# Patient Record
Sex: MALE | Race: WHITE | Employment: FULL TIME | URBAN - METROPOLITAN AREA
[De-identification: names, ages, dates, MRNs, and addresses within clinical notes are randomized per-mention and may not be internally consistent; named-entity substitution may affect disease eponyms.]

---

## 2020-02-24 ENCOUNTER — OP HISTORICAL/CONVERTED ENCOUNTER (OUTPATIENT)
Dept: OTHER | Age: 65
End: 2020-02-24

## 2021-11-08 ENCOUNTER — TRANSCRIBE ORDER (OUTPATIENT)
Dept: SCHEDULING | Age: 66
End: 2021-11-08

## 2021-11-08 DIAGNOSIS — R29.898 RIGHT ARM WEAKNESS: Primary | ICD-10-CM

## 2022-07-30 ENCOUNTER — TRANSCRIBE ORDER (OUTPATIENT)
Dept: SCHEDULING | Age: 67
End: 2022-07-30

## 2022-08-02 ENCOUNTER — TRANSCRIBE ORDER (OUTPATIENT)
Dept: SCHEDULING | Age: 67
End: 2022-08-02

## 2022-08-02 DIAGNOSIS — G25.5 MUSCLE, JERKY MOVEMENTS (UNCONTROLLED): Primary | ICD-10-CM

## 2022-08-17 ENCOUNTER — HOSPITAL ENCOUNTER (OUTPATIENT)
Dept: MRI IMAGING | Age: 67
Discharge: HOME | End: 2022-08-17
Attending: FAMILY MEDICINE
Payer: COMMERCIAL

## 2022-08-17 DIAGNOSIS — G25.5 MUSCLE, JERKY MOVEMENTS (UNCONTROLLED): ICD-10-CM

## 2022-08-17 LAB — CREAT BLD-MCNC: 0.7 MG/DL (ref 0.6–1.3)

## 2022-08-17 PROCEDURE — A9577 INJ MULTIHANCE: HCPCS | Performed by: FAMILY MEDICINE

## 2022-08-17 PROCEDURE — 70553 MRI BRAIN STEM W/O & W/DYE: CPT

## 2022-08-17 PROCEDURE — 82565 ASSAY OF CREATININE: CPT

## 2022-08-17 PROCEDURE — 74011250636 HC RX REV CODE- 250/636: Performed by: FAMILY MEDICINE

## 2022-08-17 RX ADMIN — GADOBENATE DIMEGLUMINE 20 ML: 529 INJECTION, SOLUTION INTRAVENOUS at 14:44

## 2022-08-22 ENCOUNTER — TRANSCRIBE ORDER (OUTPATIENT)
Dept: SCHEDULING | Age: 67
End: 2022-08-22

## 2022-08-22 DIAGNOSIS — I65.29 INTERNAL CAROTID ARTERY STENOSIS: Primary | ICD-10-CM

## 2022-09-07 ENCOUNTER — TRANSCRIBE ORDER (OUTPATIENT)
Dept: SCHEDULING | Age: 67
End: 2022-09-07

## 2022-09-07 DIAGNOSIS — I65.22 OCCLUSION OF LEFT CAROTID ARTERY: Primary | ICD-10-CM

## 2022-09-13 ENCOUNTER — HOSPITAL ENCOUNTER (OUTPATIENT)
Dept: CT IMAGING | Age: 67
Discharge: HOME | End: 2022-09-13
Payer: COMMERCIAL

## 2022-09-13 DIAGNOSIS — I65.22 OCCLUSION OF LEFT CAROTID ARTERY: ICD-10-CM

## 2022-09-13 PROCEDURE — 70498 CT ANGIOGRAPHY NECK: CPT

## 2022-09-13 PROCEDURE — 74011000636 HC RX REV CODE- 636: Performed by: SURGERY

## 2022-09-13 RX ADMIN — IOPAMIDOL 100 ML: 755 INJECTION, SOLUTION INTRAVENOUS at 13:32

## 2024-05-04 ENCOUNTER — APPOINTMENT (OUTPATIENT)
Facility: HOSPITAL | Age: 69
End: 2024-05-04
Payer: MEDICARE

## 2024-05-04 ENCOUNTER — HOSPITAL ENCOUNTER (EMERGENCY)
Facility: HOSPITAL | Age: 69
Discharge: HOME | End: 2024-05-04
Attending: EMERGENCY MEDICINE
Payer: MEDICARE

## 2024-05-04 VITALS
RESPIRATION RATE: 16 BRPM | HEIGHT: 69 IN | SYSTOLIC BLOOD PRESSURE: 178 MMHG | BODY MASS INDEX: 31.1 KG/M2 | OXYGEN SATURATION: 97 % | TEMPERATURE: 98.2 F | DIASTOLIC BLOOD PRESSURE: 86 MMHG | HEART RATE: 78 BPM | WEIGHT: 210 LBS

## 2024-05-04 DIAGNOSIS — I10 ESSENTIAL HYPERTENSION: ICD-10-CM

## 2024-05-04 DIAGNOSIS — S42.024A CLOSED NONDISPLACED FRACTURE OF SHAFT OF RIGHT CLAVICLE, INITIAL ENCOUNTER: Primary | ICD-10-CM

## 2024-05-04 PROCEDURE — 6370000000 HC RX 637 (ALT 250 FOR IP): Performed by: EMERGENCY MEDICINE

## 2024-05-04 PROCEDURE — 99283 EMERGENCY DEPT VISIT LOW MDM: CPT

## 2024-05-04 PROCEDURE — 71045 X-RAY EXAM CHEST 1 VIEW: CPT

## 2024-05-04 PROCEDURE — 73030 X-RAY EXAM OF SHOULDER: CPT

## 2024-05-04 PROCEDURE — 73080 X-RAY EXAM OF ELBOW: CPT

## 2024-05-04 RX ORDER — HYDROCODONE BITARTRATE AND ACETAMINOPHEN 5; 325 MG/1; MG/1
1 TABLET ORAL EVERY 6 HOURS PRN
Qty: 12 TABLET | Refills: 0 | Status: SHIPPED | OUTPATIENT
Start: 2024-05-04 | End: 2024-05-07

## 2024-05-04 RX ORDER — AMLODIPINE BESYLATE 5 MG/1
10 TABLET ORAL
Status: COMPLETED | OUTPATIENT
Start: 2024-05-04 | End: 2024-05-04

## 2024-05-04 RX ORDER — HYDROCODONE BITARTRATE AND ACETAMINOPHEN 5; 325 MG/1; MG/1
1 TABLET ORAL
Status: COMPLETED | OUTPATIENT
Start: 2024-05-04 | End: 2024-05-04

## 2024-05-04 RX ADMIN — AMLODIPINE BESYLATE 10 MG: 5 TABLET ORAL at 22:26

## 2024-05-04 RX ADMIN — HYDROCODONE BITARTRATE AND ACETAMINOPHEN 1 TABLET: 5; 325 TABLET ORAL at 20:55

## 2024-05-04 ASSESSMENT — PAIN DESCRIPTION - PAIN TYPE: TYPE: ACUTE PAIN

## 2024-05-04 ASSESSMENT — PAIN - FUNCTIONAL ASSESSMENT
PAIN_FUNCTIONAL_ASSESSMENT: 0-10

## 2024-05-04 ASSESSMENT — PAIN SCALES - GENERAL
PAINLEVEL_OUTOF10: 9
PAINLEVEL_OUTOF10: 6
PAINLEVEL_OUTOF10: 4
PAINLEVEL_OUTOF10: 4

## 2024-05-04 ASSESSMENT — PAIN DESCRIPTION - LOCATION
LOCATION: SHOULDER
LOCATION: SHOULDER

## 2024-05-04 ASSESSMENT — PAIN DESCRIPTION - ORIENTATION
ORIENTATION: RIGHT
ORIENTATION: RIGHT

## 2024-05-04 ASSESSMENT — LIFESTYLE VARIABLES
HOW MANY STANDARD DRINKS CONTAINING ALCOHOL DO YOU HAVE ON A TYPICAL DAY: PATIENT DOES NOT DRINK
HOW OFTEN DO YOU HAVE A DRINK CONTAINING ALCOHOL: NEVER

## 2024-05-05 NOTE — ED TRIAGE NOTES
Presents with EMS after ground level fall, falling to right shoulder pain, states was splinting right arm on arrival and c/o right clavicular pain. Patient presents splinted by EMS. Denies LOC, denies hitting head, denies blood thinner use. Hx of stroke.

## 2024-05-05 NOTE — DISCHARGE INSTRUCTIONS
Thank you!  Thank you for allowing me to care for you in the emergency department. It is my goal to provide you with excellent care.  Please fill out the survey that will come to you by mail or email since we listen to your feedback!     Below you will find a list of your tests from today's visit.  Should you have any questions, please do not hesitate to call the emergency department.    Labs  No results found for this or any previous visit (from the past 12 hour(s)).    Radiologic Studies  XR CHEST PORTABLE   Final Result      Comminuted midshaft right clavicle fracture. Lungs are clear.         XR ELBOW RIGHT (MIN 3 VIEWS)   Final Result   No acute abnormality.      XR SHOULDER RIGHT (MIN 2 VIEWS)   Final Result   Comminuted angulated midshaft clavicle fracture.        ------------------------------------------------------------------------------------------------------------  The exam and treatment you received in the Emergency Department were for an urgent problem and are not intended as complete care. It is important that you follow-up with a doctor, nurse practitioner, or physician assistant to:  (1) confirm your diagnosis,  (2) re-evaluation of changes in your illness and treatment, and (3) for ongoing care. Please take your discharge instructions with you when you go to your follow-up appointment.     If you have any problem arranging a follow-up appointment, contact the Emergency Department.  If your symptoms become worse or you do not improve as expected and you are unable to reach your health care provider, please return to the Emergency Department. We are available 24 hours a day.     If a prescription has been provided, please have it filled as soon as possible to prevent a delay in treatment. If you have any questions or reservations about taking the medication due to side effects or interactions with other medications, please call your primary care provider or contact the ER.

## 2024-05-05 NOTE — ED PROVIDER NOTES
LAB, EKG AND DIAGNOSTIC RESULTS   Labs:  No results found for this or any previous visit (from the past 12 hour(s)).    EKG:.Not Applicable    Radiologic Studies:  Non-plain film images such as CT, Ultrasound and MRI are read by the radiologist. Plain radiographic images are visualized and preliminarily interpreted by the ED Provider with the following findings: See ED Course Below    Interpretation per the Radiologist below, if available at the time of this note:  XR CHEST PORTABLE   Final Result      Comminuted midshaft right clavicle fracture. Lungs are clear.         XR ELBOW RIGHT (MIN 3 VIEWS)   Final Result   No acute abnormality.      XR SHOULDER RIGHT (MIN 2 VIEWS)   Final Result   Comminuted angulated midshaft clavicle fracture.           ED COURSE and DIFFERENTIAL DIAGNOSIS/MDM   9:21 PM Differential and Considerations: 69-year-old status post mechanical fall with shoulder injury.  Neurovascularly intact.  Will obtain x-rays consider shoulder fracture versus dislocation versus clavicle injury    Records Reviewed (source and summary of external notes): Prior medical records and Nursing notes.    Vitals:    Vitals:    05/04/24 2011   BP: (!) 216/107   Pulse: 84   Resp: 16   Temp: 98.6 °F (37 °C)   TempSrc: Oral   SpO2: 96%   Weight: 95.3 kg (210 lb)   Height: 1.753 m (5' 9\")        ED COURSE  ED Course as of 05/04/24 2121   Sat May 04, 2024   2106 XR ELBOW RIGHT (MIN 3 VIEWS)  FINDINGS: Three views of the right elbow demonstrate no fracture, dislocation,  effusion or other acute abnormality.     IMPRESSION:  No acute abnormality.    -> X-ray reviewed independently by ER physician no evidence of fracture or dislocation.   [HP]   2106 XR CHEST PORTABLE  IMPRESSION:     Comminuted midshaft right clavicle fracture. Lungs are clear.    Chest x-ray interpreted independently by ER physician positive for clavicle fracture [HP]   2107 XR SHOULDER RIGHT (MIN 2 VIEWS)  FINDINGS: Three views of the

## 2024-09-17 ENCOUNTER — HOSPITAL ENCOUNTER (OUTPATIENT)
Facility: HOSPITAL | Age: 69
Discharge: HOME | End: 2024-09-19
Attending: FAMILY MEDICINE
Payer: MEDICARE

## 2024-09-17 DIAGNOSIS — I63.9 CEREBRAL INFARCTION, UNSPECIFIED MECHANISM (HCC): ICD-10-CM

## 2024-09-17 PROCEDURE — 93880 EXTRACRANIAL BILAT STUDY: CPT

## 2024-09-20 LAB
VAS LEFT CCA DIST EDV: 13.6 CM/S
VAS LEFT CCA DIST PSV: 69.3 CM/S
VAS LEFT CCA PROX EDV: 11.6 CM/S
VAS LEFT CCA PROX PSV: 73.4 CM/S
VAS LEFT DIST OUTFLOW EDV: 6.6 CM/S
VAS LEFT DIST OUTFLOW PSV: 82.5 CM/S
VAS LEFT ECA EDV: 17.1 CM/S
VAS LEFT ECA PSV: 107 CM/S
VAS LEFT GRAFT 1: NORMAL
VAS LEFT ICA DIST EDV: 8.2 CM/S
VAS LEFT ICA DIST PSV: 64.6 CM/S
VAS LEFT ICA PROX EDV: 12.3 CM/S
VAS LEFT ICA PROX PSV: 120 CM/S
VAS LEFT ICA/CCA PSV: 1.73
VAS LEFT INFLOW ARTERY EDV: 7.7 CM/S
VAS LEFT INFLOW ARTERY PSV: 67.7 CM/S
VAS LEFT MID OUTFLOW EDV: 12.3 CM/S
VAS LEFT MID OUTFLOW PSV: 120 CM/S
VAS LEFT OUTFLOW VESSEL EDV: 8.2 CM/S
VAS LEFT OUTFLOW VESSEL PSV: 64.6 CM/S
VAS LEFT PROX OUTFLOW EDV: 6.9 CM/S
VAS LEFT PROX OUTFLOW PSV: 84 CM/S
VAS LEFT SUBCLAVIAN PROX EDV: 0 CM/S
VAS LEFT SUBCLAVIAN PROX PSV: 78.1 CM/S
VAS LEFT VERTEBRAL EDV: 10.2 CM/S
VAS LEFT VERTEBRAL PSV: 48.9 CM/S
VAS RIGHT CCA DIST EDV: 12.3 CM/S
VAS RIGHT CCA DIST PSV: 80.5 CM/S
VAS RIGHT CCA PROX EDV: 10.7 CM/S
VAS RIGHT CCA PROX PSV: 91.2 CM/S
VAS RIGHT ECA EDV: 1.71 CM/S
VAS RIGHT ECA PSV: 247 CM/S
VAS RIGHT ICA DIST EDV: 15.6 CM/S
VAS RIGHT ICA DIST PSV: 70.2 CM/S
VAS RIGHT ICA PROX EDV: 32 CM/S
VAS RIGHT ICA PROX PSV: 159 CM/S
VAS RIGHT ICA/CCA PSV: 1.98
VAS RIGHT SUBCLAVIAN PROX EDV: 0 CM/S
VAS RIGHT SUBCLAVIAN PROX PSV: 80.5 CM/S
VAS RIGHT VERTEBRAL EDV: 6.45 CM/S
VAS RIGHT VERTEBRAL PSV: 39.8 CM/S

## 2024-10-19 PROBLEM — I63.9 CEREBRAL INFARCTION (HCC): Status: ACTIVE | Noted: 2024-10-19

## 2025-02-25 ENCOUNTER — APPOINTMENT (OUTPATIENT)
Facility: HOSPITAL | Age: 70
End: 2025-02-25
Payer: MEDICARE

## 2025-02-25 ENCOUNTER — HOSPITAL ENCOUNTER (EMERGENCY)
Facility: HOSPITAL | Age: 70
Discharge: HOME | End: 2025-02-25
Payer: MEDICARE

## 2025-02-25 VITALS
HEIGHT: 69 IN | RESPIRATION RATE: 18 BRPM | HEART RATE: 80 BPM | DIASTOLIC BLOOD PRESSURE: 70 MMHG | WEIGHT: 210 LBS | SYSTOLIC BLOOD PRESSURE: 148 MMHG | OXYGEN SATURATION: 100 % | BODY MASS INDEX: 31.1 KG/M2 | TEMPERATURE: 98 F

## 2025-02-25 DIAGNOSIS — K64.4 EXTERNAL HEMORRHOIDS: ICD-10-CM

## 2025-02-25 DIAGNOSIS — K62.5 RECTAL BLEEDING: Primary | ICD-10-CM

## 2025-02-25 LAB
ALBUMIN SERPL-MCNC: 3.3 G/DL (ref 3.5–5)
ALBUMIN/GLOB SERPL: 0.9 (ref 1.1–2.2)
ALP SERPL-CCNC: 122 U/L (ref 45–117)
ALT SERPL-CCNC: 22 U/L (ref 12–78)
ANION GAP SERPL CALC-SCNC: 7 MMOL/L (ref 2–12)
AST SERPL W P-5'-P-CCNC: 20 U/L (ref 15–37)
BASOPHILS # BLD: 0.03 K/UL (ref 0–0.1)
BASOPHILS NFR BLD: 0.4 % (ref 0–1)
BILIRUB SERPL-MCNC: 0.5 MG/DL (ref 0.2–1)
BUN SERPL-MCNC: 15 MG/DL (ref 6–20)
BUN/CREAT SERPL: 18 (ref 12–20)
CA-I BLD-MCNC: 9.2 MG/DL (ref 8.5–10.1)
CHLORIDE SERPL-SCNC: 111 MMOL/L (ref 97–108)
CO2 SERPL-SCNC: 25 MMOL/L (ref 21–32)
CREAT SERPL-MCNC: 0.82 MG/DL (ref 0.7–1.3)
DIFFERENTIAL METHOD BLD: ABNORMAL
EOSINOPHIL # BLD: 0.14 K/UL (ref 0–0.4)
EOSINOPHIL NFR BLD: 1.7 % (ref 0–7)
ERYTHROCYTE [DISTWIDTH] IN BLOOD BY AUTOMATED COUNT: 13 % (ref 11.5–14.5)
GLOBULIN SER CALC-MCNC: 3.6 G/DL (ref 2–4)
GLUCOSE SERPL-MCNC: 222 MG/DL (ref 65–100)
HCT VFR BLD AUTO: 41.4 % (ref 36.6–50.3)
HGB BLD-MCNC: 13.9 G/DL (ref 12.1–17)
IMM GRANULOCYTES # BLD AUTO: 0.02 K/UL (ref 0–0.04)
IMM GRANULOCYTES NFR BLD AUTO: 0.2 % (ref 0–0.5)
LYMPHOCYTES # BLD: 1.25 K/UL (ref 0.8–3.5)
LYMPHOCYTES NFR BLD: 15 % (ref 12–49)
MCH RBC QN AUTO: 30.1 PG (ref 26–34)
MCHC RBC AUTO-ENTMCNC: 33.6 G/DL (ref 30–36.5)
MCV RBC AUTO: 89.6 FL (ref 80–99)
MONOCYTES # BLD: 0.52 K/UL (ref 0–1)
MONOCYTES NFR BLD: 6.2 % (ref 5–13)
NEUTS SEG # BLD: 6.38 K/UL (ref 1.8–8)
NEUTS SEG NFR BLD: 76.5 % (ref 32–75)
NRBC # BLD: 0 K/UL (ref 0–0.01)
NRBC BLD-RTO: 0 PER 100 WBC
PLATELET # BLD AUTO: 218 K/UL (ref 150–400)
PMV BLD AUTO: 11.2 FL (ref 8.9–12.9)
POTASSIUM SERPL-SCNC: 3.6 MMOL/L (ref 3.5–5.1)
PROT SERPL-MCNC: 6.9 G/DL (ref 6.4–8.2)
RBC # BLD AUTO: 4.62 M/UL (ref 4.1–5.7)
SODIUM SERPL-SCNC: 143 MMOL/L (ref 136–145)
WBC # BLD AUTO: 8.3 K/UL (ref 4.1–11.1)

## 2025-02-25 PROCEDURE — 74174 CTA ABD&PLVS W/CONTRAST: CPT

## 2025-02-25 PROCEDURE — 99285 EMERGENCY DEPT VISIT HI MDM: CPT

## 2025-02-25 PROCEDURE — 80053 COMPREHEN METABOLIC PANEL: CPT

## 2025-02-25 PROCEDURE — 36415 COLL VENOUS BLD VENIPUNCTURE: CPT

## 2025-02-25 PROCEDURE — 6360000004 HC RX CONTRAST MEDICATION

## 2025-02-25 PROCEDURE — 85025 COMPLETE CBC W/AUTO DIFF WBC: CPT

## 2025-02-25 RX ORDER — IOPAMIDOL 755 MG/ML
100 INJECTION, SOLUTION INTRAVASCULAR
Status: COMPLETED | OUTPATIENT
Start: 2025-02-25 | End: 2025-02-25

## 2025-02-25 RX ORDER — POLYETHYLENE GLYCOL 3350 17 G/17G
17 POWDER, FOR SOLUTION ORAL DAILY PRN
Qty: 30 PACKET | Refills: 0 | Status: SHIPPED | OUTPATIENT
Start: 2025-02-25 | End: 2025-03-27

## 2025-02-25 RX ADMIN — IOPAMIDOL 100 ML: 755 INJECTION, SOLUTION INTRAVENOUS at 02:28

## 2025-02-25 ASSESSMENT — PAIN - FUNCTIONAL ASSESSMENT
PAIN_FUNCTIONAL_ASSESSMENT: NONE - DENIES PAIN
PAIN_FUNCTIONAL_ASSESSMENT: NONE - DENIES PAIN

## 2025-02-25 NOTE — ED TRIAGE NOTES
PER Pt: Pt reports a moderate amount of rectal bleeding, bright red blood that started tonight. Pt feels like he is constipated. +BT. Denies SOB, chest pain, N&V.    PMH: Stroke; right sided deficits and mild aphasia at baseline.

## 2025-02-25 NOTE — DISCHARGE INSTRUCTIONS
Thank you for choosing our Emergency Department for your care.  It is our privilege to care for you in your time of need.  In the next several days, you may receive a survey via email or mailed to your home about your experience with our team.  We would greatly appreciate you taking a few minutes to complete the survey, as we use this information to learn what we have done well and what we could be doing better. Thank you for trusting us with your care!    Below you will find a list of your tests from today's visit.   Labs and Radiology Studies  Recent Results (from the past 12 hour(s))   CBC with Auto Differential    Collection Time: 02/25/25  1:28 AM   Result Value Ref Range    WBC 8.3 4.1 - 11.1 K/uL    RBC 4.62 4.10 - 5.70 M/uL    Hemoglobin 13.9 12.1 - 17.0 g/dL    Hematocrit 41.4 36.6 - 50.3 %    MCV 89.6 80.0 - 99.0 FL    MCH 30.1 26.0 - 34.0 PG    MCHC 33.6 30.0 - 36.5 g/dL    RDW 13.0 11.5 - 14.5 %    Platelets 218 150 - 400 K/uL    MPV 11.2 8.9 - 12.9 FL    Nucleated RBCs 0.0 0.0  WBC    nRBC 0.00 0.00 - 0.01 K/uL    Neutrophils % 76.5 (H) 32.0 - 75.0 %    Lymphocytes % 15.0 12.0 - 49.0 %    Monocytes % 6.2 5.0 - 13.0 %    Eosinophils % 1.7 0.0 - 7.0 %    Basophils % 0.4 0.0 - 1.0 %    Immature Granulocytes % 0.2 0 - 0.5 %    Neutrophils Absolute 6.38 1.80 - 8.00 K/UL    Lymphocytes Absolute 1.25 0.80 - 3.50 K/UL    Monocytes Absolute 0.52 0.00 - 1.00 K/UL    Eosinophils Absolute 0.14 0.00 - 0.40 K/UL    Basophils Absolute 0.03 0.00 - 0.10 K/UL    Immature Granulocytes Absolute 0.02 0.00 - 0.04 K/UL    Differential Type AUTOMATED     Comprehensive Metabolic Panel    Collection Time: 02/25/25  1:28 AM   Result Value Ref Range    Sodium 143 136 - 145 mmol/L    Potassium 3.6 3.5 - 5.1 mmol/L    Chloride 111 (H) 97 - 108 mmol/L    CO2 25 21 - 32 mmol/L    Anion Gap 7 2 - 12 mmol/L    Glucose 222 (H) 65 - 100 mg/dL    BUN 15 6 - 20 mg/dL    Creatinine 0.82 0.70 - 1.30 mg/dL    BUN/Creatinine Ratio 18

## 2025-02-25 NOTE — ED PROVIDER NOTES
[facility] EMERGENCY DEPT  EMERGENCY DEPARTMENT HISTORY AND PHYSICAL EXAM      Date: 2/25/2025  Patient Name: Stewart Ramos  MRN: [id number]  Birthdate 1955  Date of evaluation: 2/25/2025  Provider: Rachelle Herrera MD   Note Started: 7:32 AM EST 2/25/25    HISTORY OF PRESENT ILLNESS     Chief Complaint   Patient presents with    Rectal Bleeding       History Provided By: Patient    HPI: Stewart Ramos is a 69 y.o. male who presents with bright red bleeding per rectum.  Patient reports that he has had some mild constipation, reports that today he was straining to poop and was unable to have a bowel movement.  He tried 1 to the bathroom several times, then the last time he noted a few drops of blood in the toilet, also felt a \"knot\" near his anus.  Has had mild abdominal cramping, denies any nausea or vomiting.  He is on Plavix.    PAST MEDICAL HISTORY   Past Medical History:  Past Medical History:   Diagnosis Date    CVA (cerebral vascular accident) (HCC)     Diabetes mellitus (HCC)     Hypertension        Past Surgical History:  History reviewed. No pertinent surgical history.    Family History:  History reviewed. No pertinent family history.    Social History:  Social History     Tobacco Use    Smoking status: Former     Types: Cigarettes   Substance Use Topics    Alcohol use: Never    Drug use: Never       Allergies:  No Known Allergies    PCP: Roel Benítez MD    Current Meds:   No current facility-administered medications for this encounter.     Current Outpatient Medications   Medication Sig Dispense Refill    polyethylene glycol (MIRALAX) 17 g packet Take 1 packet by mouth daily as needed for Constipation 30 packet 0       Social Determinants of Health:   Social Determinants of Health     Tobacco Use: Medium Risk (10/14/2024)    Received from [facility]    Patient History     Smoking Tobacco Use: Former     Smokeless Tobacco Use: Never     Passive Exposure: Not on file   Alcohol Use: Not At Risk

## 2025-03-17 ENCOUNTER — TRANSCRIBE ORDERS (OUTPATIENT)
Facility: HOSPITAL | Age: 70
End: 2025-03-17

## 2025-03-17 ENCOUNTER — TELEPHONE (OUTPATIENT)
Age: 70
End: 2025-03-17

## 2025-03-17 DIAGNOSIS — I70.209 ATHEROSCLEROSIS OF NATIVE ARTERY OF EXTREMITY, UNSPECIFIED EXTREMITY, WITH UNSPECIFIED PRESENCE OF CLINICAL MANIFESTATION: Primary | ICD-10-CM

## 2025-03-17 NOTE — TELEPHONE ENCOUNTER
Received a referral tried to call patient left a message for patient to call the office and schedule an appointment.

## 2025-04-01 ENCOUNTER — HOSPITAL ENCOUNTER (OUTPATIENT)
Facility: HOSPITAL | Age: 70
Discharge: HOME | End: 2025-04-03
Attending: FAMILY MEDICINE
Payer: MEDICARE

## 2025-04-01 DIAGNOSIS — I70.209 ATHEROSCLEROSIS OF NATIVE ARTERY OF EXTREMITY, UNSPECIFIED EXTREMITY, WITH UNSPECIFIED PRESENCE OF CLINICAL MANIFESTATION: ICD-10-CM

## 2025-04-01 PROCEDURE — 93922 UPR/L XTREMITY ART 2 LEVELS: CPT

## 2025-04-02 LAB
VAS LEFT ABI: 0.35
VAS LEFT ARM BP: 155 MMHG
VAS LEFT PTA BP: 54 MMHG
VAS LEFT TBI: 0.63
VAS LEFT TOE PRESSURE: 98 MMHG
VAS RIGHT ABI: 0.81
VAS RIGHT ARM BP: 153 MMHG
VAS RIGHT PTA BP: 126 MMHG
VAS RIGHT TBI: 0.68
VAS RIGHT TOE PRESSURE: 106 MMHG

## 2025-04-02 PROCEDURE — 93922 UPR/L XTREMITY ART 2 LEVELS: CPT | Performed by: SURGERY

## 2025-04-17 ENCOUNTER — OFFICE VISIT (OUTPATIENT)
Age: 70
End: 2025-04-17
Payer: MEDICARE

## 2025-04-17 VITALS
RESPIRATION RATE: 17 BRPM | DIASTOLIC BLOOD PRESSURE: 63 MMHG | TEMPERATURE: 98 F | OXYGEN SATURATION: 98 % | HEART RATE: 71 BPM | WEIGHT: 198.5 LBS | HEIGHT: 69 IN | BODY MASS INDEX: 29.4 KG/M2 | SYSTOLIC BLOOD PRESSURE: 149 MMHG

## 2025-04-17 DIAGNOSIS — I70.25 ATHEROSCLEROSIS OF NATIVE ARTERY OF LOWER EXTREMITY WITH ULCERATION, UNSPECIFIED LATERALITY, UNSPECIFIED ULCERATION SITE (HCC): Primary | ICD-10-CM

## 2025-04-17 PROCEDURE — G8419 CALC BMI OUT NRM PARAM NOF/U: HCPCS | Performed by: SURGERY

## 2025-04-17 PROCEDURE — G8427 DOCREV CUR MEDS BY ELIG CLIN: HCPCS | Performed by: SURGERY

## 2025-04-17 PROCEDURE — 1126F AMNT PAIN NOTED NONE PRSNT: CPT | Performed by: SURGERY

## 2025-04-17 PROCEDURE — 99203 OFFICE O/P NEW LOW 30 MIN: CPT | Performed by: SURGERY

## 2025-04-17 PROCEDURE — 4004F PT TOBACCO SCREEN RCVD TLK: CPT | Performed by: SURGERY

## 2025-04-17 PROCEDURE — 1123F ACP DISCUSS/DSCN MKR DOCD: CPT | Performed by: SURGERY

## 2025-04-17 PROCEDURE — 3017F COLORECTAL CA SCREEN DOC REV: CPT | Performed by: SURGERY

## 2025-04-17 RX ORDER — LOSARTAN POTASSIUM 100 MG/1
100 TABLET ORAL DAILY
COMMUNITY

## 2025-04-17 RX ORDER — SILDENAFIL 100 MG/1
100 TABLET, FILM COATED ORAL PRN
COMMUNITY

## 2025-04-17 RX ORDER — GLIPIZIDE 10 MG/1
10 TABLET ORAL DAILY
COMMUNITY

## 2025-04-17 RX ORDER — CLOPIDOGREL BISULFATE 75 MG/1
75 TABLET ORAL DAILY
COMMUNITY

## 2025-04-17 RX ORDER — TADALAFIL 20 MG/1
TABLET ORAL
COMMUNITY
Start: 2025-02-01

## 2025-04-17 RX ORDER — CALCIUM CITRATE/VITAMIN D3 200MG-6.25
TABLET ORAL
COMMUNITY
Start: 2025-01-17

## 2025-04-17 RX ORDER — AMLODIPINE BESYLATE 5 MG/1
1 TABLET ORAL DAILY
COMMUNITY

## 2025-04-17 RX ORDER — ASPIRIN 81 MG/1
81 TABLET ORAL DAILY
COMMUNITY

## 2025-04-17 RX ORDER — ATORVASTATIN CALCIUM 40 MG/1
40 TABLET, FILM COATED ORAL DAILY
COMMUNITY
Start: 2024-03-26

## 2025-04-17 ASSESSMENT — PATIENT HEALTH QUESTIONNAIRE - PHQ9
SUM OF ALL RESPONSES TO PHQ QUESTIONS 1-9: 0
2. FEELING DOWN, DEPRESSED OR HOPELESS: NOT AT ALL
SUM OF ALL RESPONSES TO PHQ QUESTIONS 1-9: 0
1. LITTLE INTEREST OR PLEASURE IN DOING THINGS: NOT AT ALL

## 2025-04-17 NOTE — PROGRESS NOTES
Identified pt with two pt identifiers (name and ). Reviewed chart in preparation for visit and have obtained necessary documentation.    Stewart Ramos is a 69 y.o. male  Chief Complaint   Patient presents with    New Patient     Bilateral leg stents      BP (!) 149/63 (BP Site: Left Upper Arm, Patient Position: Sitting, BP Cuff Size: Large Adult)   Pulse 71   Temp 98 °F (36.7 °C) (Oral)   Resp 17   Ht 1.753 m (5' 9\")   Wt 90 kg (198 lb 8 oz)   SpO2 98%   BMI 29.31 kg/m²   Patient and provider made aware of elevated BP x2. Patient asymptomatic. Patient reminded to monitor BP, continue to take BP medications if prescribed, and follow up with PCP/Cardiologist.  Patient expressed understanding and agreement.

## 2025-04-22 PROBLEM — I70.209 ATHEROSCLEROSIS OF NATIVE ARTERY OF EXTREMITY: Status: ACTIVE | Noted: 2025-04-22

## 2025-04-25 NOTE — PROGRESS NOTES
Vascular History and Physical    Patient: Stewart Ramos  MRN: [id number]    YOB: 1955  Age: 70 y.o.  Sex: male     Chief Complaint:  Chief Complaint   Patient presents with    New Patient     Bilateral leg stents        History of Present Illness: Stweart Ramos is a 70 y.o.  very pleasant man is here today for vascular consult. He is c/o left leg pain. Recent GAGE shows  0.35 on the left ankle    Social History:  Social Connections: Not on file       Past Medical History:  Past Medical History:   Diagnosis Date    CVA (cerebral vascular accident) (HCC)     Diabetes mellitus (HCC)     Hypertension        Surgical History:  Past Surgical History:   Procedure Laterality Date    CAROTID ARTERY SURGERY Right        Allergies:  No Known Allergies    Current Meds:  Current Outpatient Medications   Medication Sig Dispense Refill    amLODIPine (NORVASC) 5 MG tablet Take 1 tablet by mouth daily      atorvastatin (LIPITOR) 40 MG tablet Take 1 tablet by mouth daily      clopidogrel (PLAVIX) 75 MG tablet Take 1 tablet by mouth daily      glipiZIDE (GLUCOTROL) 10 MG tablet Take 1 tablet by mouth daily      TRUE METRIX BLOOD GLUCOSE TEST strip USE TWICE DAILY TO CHECH SUGARS      losartan (COZAAR) 100 MG tablet Take 1 tablet by mouth daily      sildenafil (VIAGRA) 100 MG tablet Take 1 tablet by mouth as needed      tadalafil (CIALIS) 20 MG tablet TAKE 1 TABLET BY MOUTH EVERY OTHER DAY AS NEEDED      insulin NPH (NOVOLIN N) 100 UNIT/ML injection vial Inject into the skin      metFORMIN (GLUCOPHAGE) 500 MG tablet Take 1 tablet by mouth 2 times daily (with meals)      aspirin 81 MG EC tablet Take 1 tablet by mouth daily       No current facility-administered medications for this visit.       Review of Systems:  I reviewed the rest of organ systems personally and they are negative.  Pertinent findings discussed in HPI.    Physical Examination    BP (!) 149/63 (BP Site: Left Upper Arm, Patient Position: Sitting, BP Cuff

## 2025-05-13 ENCOUNTER — TELEPHONE (OUTPATIENT)
Age: 70
End: 2025-05-13

## 2025-05-13 NOTE — TELEPHONE ENCOUNTER
Raissa from [facility] called stating patient thought  was to do a procedure today Raissa would like one of the nurses to reach out to patient and advise him when his procedure will be

## 2025-05-19 NOTE — TELEPHONE ENCOUNTER
I spoke with  he said he has tried to call the patient multiple times. He is going to reschedule him for 06/10/2025.     I tried to call the patient as well no answer.

## 2025-05-19 NOTE — TELEPHONE ENCOUNTER
I spoke with Stewart Ramos 2 patient identifiers used. The patient states he agrees with June 10th. I will keep in my box to make sure  sends posting.

## 2025-06-03 ENCOUNTER — TELEPHONE (OUTPATIENT)
Age: 70
End: 2025-06-03

## 2025-06-06 ENCOUNTER — HOSPITAL ENCOUNTER (OUTPATIENT)
Facility: HOSPITAL | Age: 70
Discharge: HOME | End: 2025-06-09
Payer: MEDICARE

## 2025-06-06 VITALS
HEIGHT: 67 IN | BODY MASS INDEX: 30.26 KG/M2 | DIASTOLIC BLOOD PRESSURE: 73 MMHG | RESPIRATION RATE: 20 BRPM | TEMPERATURE: 98.4 F | OXYGEN SATURATION: 97 % | HEART RATE: 72 BPM | WEIGHT: 192.8 LBS | SYSTOLIC BLOOD PRESSURE: 126 MMHG

## 2025-06-06 LAB
ALBUMIN SERPL-MCNC: 3.6 G/DL (ref 3.5–5)
ALBUMIN/GLOB SERPL: 0.9 (ref 1.1–2.2)
ALP SERPL-CCNC: 100 U/L (ref 45–117)
ALT SERPL-CCNC: 23 U/L (ref 12–78)
ANION GAP SERPL CALC-SCNC: 8 MMOL/L (ref 2–12)
APTT PPP: 26.6 SEC (ref 21.2–34.1)
AST SERPL W P-5'-P-CCNC: 19 U/L (ref 15–37)
BILIRUB SERPL-MCNC: 0.4 MG/DL (ref 0.2–1)
BUN SERPL-MCNC: 17 MG/DL (ref 6–20)
BUN/CREAT SERPL: 17 (ref 12–20)
CA-I BLD-MCNC: 9.9 MG/DL (ref 8.5–10.1)
CHLORIDE SERPL-SCNC: 112 MMOL/L (ref 97–108)
CO2 SERPL-SCNC: 23 MMOL/L (ref 21–32)
CREAT SERPL-MCNC: 0.98 MG/DL (ref 0.7–1.3)
ERYTHROCYTE [DISTWIDTH] IN BLOOD BY AUTOMATED COUNT: 13 % (ref 11.5–14.5)
GLOBULIN SER CALC-MCNC: 3.8 G/DL (ref 2–4)
GLUCOSE SERPL-MCNC: 142 MG/DL (ref 65–100)
HCT VFR BLD AUTO: 43.3 % (ref 36.6–50.3)
HGB BLD-MCNC: 14.6 G/DL (ref 12.1–17)
INR PPP: 1 (ref 0.9–1.1)
MCH RBC QN AUTO: 29 PG (ref 26–34)
MCHC RBC AUTO-ENTMCNC: 33.7 G/DL (ref 30–36.5)
MCV RBC AUTO: 85.9 FL (ref 80–99)
NRBC # BLD: 0 K/UL (ref 0–0.01)
NRBC BLD-RTO: 0 PER 100 WBC
PLATELET # BLD AUTO: 279 K/UL (ref 150–400)
PMV BLD AUTO: 10.5 FL (ref 8.9–12.9)
POTASSIUM SERPL-SCNC: 3.7 MMOL/L (ref 3.5–5.1)
PROT SERPL-MCNC: 7.4 G/DL (ref 6.4–8.2)
PROTHROMBIN TIME: 13.6 SEC (ref 11.9–14.6)
RBC # BLD AUTO: 5.04 M/UL (ref 4.1–5.7)
SODIUM SERPL-SCNC: 143 MMOL/L (ref 136–145)
THERAPEUTIC RANGE: NORMAL SEC (ref 82–109)
WBC # BLD AUTO: 7.7 K/UL (ref 4.1–11.1)

## 2025-06-06 PROCEDURE — 80053 COMPREHEN METABOLIC PANEL: CPT

## 2025-06-06 PROCEDURE — 85027 COMPLETE CBC AUTOMATED: CPT

## 2025-06-06 PROCEDURE — 85610 PROTHROMBIN TIME: CPT

## 2025-06-06 PROCEDURE — 36415 COLL VENOUS BLD VENIPUNCTURE: CPT

## 2025-06-06 PROCEDURE — 85730 THROMBOPLASTIN TIME PARTIAL: CPT

## 2025-06-06 NOTE — PERIOP NOTE
PAT visit complete: patient aware to hold metformin and glipizide 2 days prior to procedure.  States he no longer takes viagra or cialis. Aware to continue taking aspirin and plavix.

## 2025-06-10 ENCOUNTER — CLINICAL DOCUMENTATION (OUTPATIENT)
Age: 70
End: 2025-06-10
Payer: MEDICARE

## 2025-06-10 ENCOUNTER — SURGICAL CONSULT (OUTPATIENT)
Age: 70
End: 2025-06-10

## 2025-06-10 ENCOUNTER — HOSPITAL ENCOUNTER (INPATIENT)
Facility: HOSPITAL | Age: 70
LOS: 1 days | Discharge: HOME | DRG: 301 | End: 2025-06-11
Attending: SURGERY
Payer: MEDICARE

## 2025-06-10 DIAGNOSIS — R33.8 BENIGN PROSTATIC HYPERPLASIA WITH URINARY RETENTION: ICD-10-CM

## 2025-06-10 DIAGNOSIS — N40.1 BENIGN PROSTATIC HYPERPLASIA WITH URINARY RETENTION: ICD-10-CM

## 2025-06-10 DIAGNOSIS — R33.9 URINARY RETENTION: ICD-10-CM

## 2025-06-10 DIAGNOSIS — M79.605 LEFT LEG PAIN: ICD-10-CM

## 2025-06-10 DIAGNOSIS — N35.014 POST-TRAUMATIC MALE URETHRAL STRICTURE: ICD-10-CM

## 2025-06-10 DIAGNOSIS — R31.0 GROSS HEMATURIA: Primary | ICD-10-CM

## 2025-06-10 PROBLEM — I73.9 PAD (PERIPHERAL ARTERY DISEASE): Status: ACTIVE | Noted: 2025-06-10

## 2025-06-10 PROBLEM — R31.9 HEMATURIA: Status: ACTIVE | Noted: 2025-06-10

## 2025-06-10 LAB
BASOPHILS # BLD: 0.04 K/UL (ref 0–0.1)
BASOPHILS NFR BLD: 0.4 % (ref 0–1)
DIFFERENTIAL METHOD BLD: ABNORMAL
EOSINOPHIL # BLD: 0.09 K/UL (ref 0–0.4)
EOSINOPHIL NFR BLD: 0.9 % (ref 0–7)
ERYTHROCYTE [DISTWIDTH] IN BLOOD BY AUTOMATED COUNT: 13.1 % (ref 11.5–14.5)
GLUCOSE BLD STRIP.AUTO-MCNC: 145 MG/DL (ref 65–100)
GLUCOSE BLD STRIP.AUTO-MCNC: 149 MG/DL (ref 65–100)
GLUCOSE BLD STRIP.AUTO-MCNC: 168 MG/DL (ref 65–100)
GLUCOSE BLD STRIP.AUTO-MCNC: 203 MG/DL (ref 65–100)
GLUCOSE BLD STRIP.AUTO-MCNC: 275 MG/DL (ref 65–100)
HCT VFR BLD AUTO: 42.2 % (ref 36.6–50.3)
HGB BLD-MCNC: 14.4 G/DL (ref 12.1–17)
IMM GRANULOCYTES # BLD AUTO: 0.02 K/UL (ref 0–0.04)
IMM GRANULOCYTES NFR BLD AUTO: 0.2 % (ref 0–0.5)
LYMPHOCYTES # BLD: 1.32 K/UL (ref 0.8–3.5)
LYMPHOCYTES NFR BLD: 12.9 % (ref 12–49)
MCH RBC QN AUTO: 29.2 PG (ref 26–34)
MCHC RBC AUTO-ENTMCNC: 34.1 G/DL (ref 30–36.5)
MCV RBC AUTO: 85.6 FL (ref 80–99)
MONOCYTES # BLD: 0.58 K/UL (ref 0–1)
MONOCYTES NFR BLD: 5.7 % (ref 5–13)
NEUTS SEG # BLD: 8.15 K/UL (ref 1.8–8)
NEUTS SEG NFR BLD: 79.9 % (ref 32–75)
NRBC # BLD: 0 K/UL (ref 0–0.01)
NRBC BLD-RTO: 0 PER 100 WBC
PERFORMED BY:: ABNORMAL
PLATELET # BLD AUTO: 251 K/UL (ref 150–400)
PMV BLD AUTO: 11 FL (ref 8.9–12.9)
RBC # BLD AUTO: 4.93 M/UL (ref 4.1–5.7)
WBC # BLD AUTO: 10.2 K/UL (ref 4.1–11.1)

## 2025-06-10 PROCEDURE — 37225 PR REVSC OPN/PRQ FEM/POP W/ATHRC/ANGIOP SM VSL: CPT | Performed by: SURGERY

## 2025-06-10 PROCEDURE — 047L3Z1 DILATION OF LEFT FEMORAL ARTERY USING DRUG-COATED BALLOON, PERCUTANEOUS APPROACH: ICD-10-PCS | Performed by: SURGERY

## 2025-06-10 PROCEDURE — 82962 GLUCOSE BLOOD TEST: CPT

## 2025-06-10 PROCEDURE — 85025 COMPLETE CBC W/AUTO DIFF WBC: CPT

## 2025-06-10 PROCEDURE — 36245 INS CATH ABD/L-EXT ART 1ST: CPT | Performed by: SURGERY

## 2025-06-10 PROCEDURE — 76937 US GUIDE VASCULAR ACCESS: CPT | Performed by: SURGERY

## 2025-06-10 PROCEDURE — 37225 HC ATHERECTOMY FEM/POP: CPT | Performed by: SURGERY

## 2025-06-10 PROCEDURE — B41D1ZZ FLUOROSCOPY OF AORTA AND BILATERAL LOWER EXTREMITY ARTERIES USING LOW OSMOLAR CONTRAST: ICD-10-PCS | Performed by: SURGERY

## 2025-06-10 PROCEDURE — 36415 COLL VENOUS BLD VENIPUNCTURE: CPT

## 2025-06-10 PROCEDURE — 36200 PLACE CATHETER IN AORTA: CPT | Performed by: SURGERY

## 2025-06-10 PROCEDURE — 7100000010 HC PHASE II RECOVERY - FIRST 15 MIN: Performed by: SURGERY

## 2025-06-10 PROCEDURE — C1884 EMBOLIZATION PROTECT SYST: HCPCS | Performed by: SURGERY

## 2025-06-10 PROCEDURE — 99204 OFFICE O/P NEW MOD 45 MIN: CPT | Performed by: UROLOGY

## 2025-06-10 PROCEDURE — 7100000001 HC PACU RECOVERY - ADDTL 15 MIN: Performed by: SURGERY

## 2025-06-10 PROCEDURE — 04CL3ZZ EXTIRPATION OF MATTER FROM LEFT FEMORAL ARTERY, PERCUTANEOUS APPROACH: ICD-10-PCS | Performed by: SURGERY

## 2025-06-10 PROCEDURE — 047N3Z1 DILATION OF LEFT POPLITEAL ARTERY USING DRUG-COATED BALLOON, PERCUTANEOUS APPROACH: ICD-10-PCS | Performed by: SURGERY

## 2025-06-10 PROCEDURE — C1887 CATHETER, GUIDING: HCPCS | Performed by: SURGERY

## 2025-06-10 PROCEDURE — C1769 GUIDE WIRE: HCPCS | Performed by: SURGERY

## 2025-06-10 PROCEDURE — 1100000000 HC RM PRIVATE

## 2025-06-10 PROCEDURE — 99152 MOD SED SAME PHYS/QHP 5/>YRS: CPT | Performed by: SURGERY

## 2025-06-10 PROCEDURE — C1894 INTRO/SHEATH, NON-LASER: HCPCS | Performed by: SURGERY

## 2025-06-10 PROCEDURE — 99153 MOD SED SAME PHYS/QHP EA: CPT | Performed by: SURGERY

## 2025-06-10 PROCEDURE — 2709999900 HC NON-CHARGEABLE SUPPLY: Performed by: SURGERY

## 2025-06-10 PROCEDURE — 75710 ARTERY X-RAYS ARM/LEG: CPT | Performed by: SURGERY

## 2025-06-10 PROCEDURE — 6360000002 HC RX W HCPCS: Performed by: SURGERY

## 2025-06-10 PROCEDURE — 36246 INS CATH ABD/L-EXT ART 2ND: CPT | Performed by: SURGERY

## 2025-06-10 PROCEDURE — 99222 1ST HOSP IP/OBS MODERATE 55: CPT | Performed by: SURGERY

## 2025-06-10 PROCEDURE — C1725 CATH, TRANSLUMIN NON-LASER: HCPCS | Performed by: SURGERY

## 2025-06-10 PROCEDURE — C2623 CATH, TRANSLUMIN, DRUG-COAT: HCPCS | Performed by: SURGERY

## 2025-06-10 PROCEDURE — 7100000000 HC PACU RECOVERY - FIRST 15 MIN: Performed by: SURGERY

## 2025-06-10 PROCEDURE — 6370000000 HC RX 637 (ALT 250 FOR IP)

## 2025-06-10 PROCEDURE — 75625 CONTRAST EXAM ABDOMINL AORTA: CPT | Performed by: SURGERY

## 2025-06-10 PROCEDURE — C1714 CATH, TRANS ATHERECTOMY, DIR: HCPCS | Performed by: SURGERY

## 2025-06-10 PROCEDURE — 2580000003 HC RX 258: Performed by: SURGERY

## 2025-06-10 PROCEDURE — 75716 ARTERY X-RAYS ARMS/LEGS: CPT | Performed by: SURGERY

## 2025-06-10 PROCEDURE — 53620 DILATE URETHRA STRICTURE: CPT | Performed by: UROLOGY

## 2025-06-10 PROCEDURE — 6360000004 HC RX CONTRAST MEDICATION: Performed by: SURGERY

## 2025-06-10 PROCEDURE — 2500000003 HC RX 250 WO HCPCS: Performed by: SURGERY

## 2025-06-10 PROCEDURE — 7100000011 HC PHASE II RECOVERY - ADDTL 15 MIN: Performed by: SURGERY

## 2025-06-10 PROCEDURE — C1760 CLOSURE DEV, VASC: HCPCS | Performed by: SURGERY

## 2025-06-10 RX ORDER — MAGNESIUM SULFATE IN WATER 40 MG/ML
2000 INJECTION, SOLUTION INTRAVENOUS PRN
Status: DISCONTINUED | OUTPATIENT
Start: 2025-06-10 | End: 2025-06-11 | Stop reason: HOSPADM

## 2025-06-10 RX ORDER — SODIUM CHLORIDE 9 MG/ML
INJECTION, SOLUTION INTRAVENOUS PRN
Status: DISCONTINUED | OUTPATIENT
Start: 2025-06-10 | End: 2025-06-10 | Stop reason: HOSPADM

## 2025-06-10 RX ORDER — ASPIRIN 81 MG/1
81 TABLET ORAL DAILY
Status: DISCONTINUED | OUTPATIENT
Start: 2025-06-10 | End: 2025-06-11 | Stop reason: HOSPADM

## 2025-06-10 RX ORDER — CLOPIDOGREL BISULFATE 75 MG/1
75 TABLET ORAL DAILY
Qty: 90 TABLET | Refills: 1 | Status: SHIPPED | OUTPATIENT
Start: 2025-06-10 | End: 2025-06-11 | Stop reason: HOSPADM

## 2025-06-10 RX ORDER — SODIUM CHLORIDE 9 MG/ML
INJECTION, SOLUTION INTRAVENOUS CONTINUOUS
Status: DISCONTINUED | OUTPATIENT
Start: 2025-06-10 | End: 2025-06-10

## 2025-06-10 RX ORDER — ONDANSETRON 4 MG/1
4 TABLET, ORALLY DISINTEGRATING ORAL EVERY 8 HOURS PRN
Status: DISCONTINUED | OUTPATIENT
Start: 2025-06-10 | End: 2025-06-11 | Stop reason: HOSPADM

## 2025-06-10 RX ORDER — POLYETHYLENE GLYCOL 3350 17 G/17G
17 POWDER, FOR SOLUTION ORAL DAILY PRN
Status: DISCONTINUED | OUTPATIENT
Start: 2025-06-10 | End: 2025-06-11 | Stop reason: HOSPADM

## 2025-06-10 RX ORDER — TAMSULOSIN HYDROCHLORIDE 0.4 MG/1
0.4 CAPSULE ORAL DAILY
Status: DISCONTINUED | OUTPATIENT
Start: 2025-06-10 | End: 2025-06-11 | Stop reason: HOSPADM

## 2025-06-10 RX ORDER — SODIUM CHLORIDE 0.9 % (FLUSH) 0.9 %
5-40 SYRINGE (ML) INJECTION EVERY 12 HOURS SCHEDULED
Status: DISCONTINUED | OUTPATIENT
Start: 2025-06-10 | End: 2025-06-10 | Stop reason: SDUPTHER

## 2025-06-10 RX ORDER — IOPAMIDOL 755 MG/ML
INJECTION, SOLUTION INTRAVASCULAR PRN
Status: DISCONTINUED | OUTPATIENT
Start: 2025-06-10 | End: 2025-06-10 | Stop reason: HOSPADM

## 2025-06-10 RX ORDER — CLOPIDOGREL 300 MG/1
300 TABLET, FILM COATED ORAL ONCE
Status: DISCONTINUED | OUTPATIENT
Start: 2025-06-10 | End: 2025-06-10 | Stop reason: HOSPADM

## 2025-06-10 RX ORDER — SODIUM CHLORIDE 0.9 % (FLUSH) 0.9 %
5-40 SYRINGE (ML) INJECTION PRN
Status: DISCONTINUED | OUTPATIENT
Start: 2025-06-10 | End: 2025-06-10 | Stop reason: HOSPADM

## 2025-06-10 RX ORDER — CLOPIDOGREL BISULFATE 75 MG/1
75 TABLET ORAL DAILY
Qty: 90 TABLET | Refills: 1 | Status: SHIPPED | OUTPATIENT
Start: 2025-06-10

## 2025-06-10 RX ORDER — MIDAZOLAM HYDROCHLORIDE 1 MG/ML
INJECTION, SOLUTION INTRAMUSCULAR; INTRAVENOUS PRN
Status: DISCONTINUED | OUTPATIENT
Start: 2025-06-10 | End: 2025-06-10 | Stop reason: HOSPADM

## 2025-06-10 RX ORDER — GLUCAGON 1 MG/ML
1 KIT INJECTION PRN
Status: DISCONTINUED | OUTPATIENT
Start: 2025-06-10 | End: 2025-06-10 | Stop reason: SDUPTHER

## 2025-06-10 RX ORDER — SODIUM CHLORIDE 0.9 % (FLUSH) 0.9 %
5-40 SYRINGE (ML) INJECTION PRN
Status: DISCONTINUED | OUTPATIENT
Start: 2025-06-10 | End: 2025-06-10 | Stop reason: SDUPTHER

## 2025-06-10 RX ORDER — SODIUM CHLORIDE 9 MG/ML
INJECTION, SOLUTION INTRAVENOUS CONTINUOUS
Status: DISCONTINUED | OUTPATIENT
Start: 2025-06-10 | End: 2025-06-11 | Stop reason: HOSPADM

## 2025-06-10 RX ORDER — POTASSIUM CHLORIDE 7.45 MG/ML
10 INJECTION INTRAVENOUS PRN
Status: DISCONTINUED | OUTPATIENT
Start: 2025-06-10 | End: 2025-06-11 | Stop reason: HOSPADM

## 2025-06-10 RX ORDER — SODIUM CHLORIDE 9 MG/ML
INJECTION, SOLUTION INTRAVENOUS PRN
Status: DISCONTINUED | OUTPATIENT
Start: 2025-06-10 | End: 2025-06-10 | Stop reason: SDUPTHER

## 2025-06-10 RX ORDER — SODIUM CHLORIDE 0.9 % (FLUSH) 0.9 %
5-40 SYRINGE (ML) INJECTION PRN
Status: DISCONTINUED | OUTPATIENT
Start: 2025-06-10 | End: 2025-06-11 | Stop reason: HOSPADM

## 2025-06-10 RX ORDER — DEXTROSE MONOHYDRATE 100 MG/ML
INJECTION, SOLUTION INTRAVENOUS CONTINUOUS PRN
Status: DISCONTINUED | OUTPATIENT
Start: 2025-06-10 | End: 2025-06-11 | Stop reason: HOSPADM

## 2025-06-10 RX ORDER — POTASSIUM CHLORIDE 1500 MG/1
40 TABLET, EXTENDED RELEASE ORAL PRN
Status: DISCONTINUED | OUTPATIENT
Start: 2025-06-10 | End: 2025-06-11 | Stop reason: HOSPADM

## 2025-06-10 RX ORDER — ONDANSETRON 2 MG/ML
4 INJECTION INTRAMUSCULAR; INTRAVENOUS EVERY 6 HOURS PRN
Status: DISCONTINUED | OUTPATIENT
Start: 2025-06-10 | End: 2025-06-11 | Stop reason: HOSPADM

## 2025-06-10 RX ORDER — GLUCAGON 1 MG/ML
1 KIT INJECTION PRN
Status: DISCONTINUED | OUTPATIENT
Start: 2025-06-10 | End: 2025-06-11 | Stop reason: HOSPADM

## 2025-06-10 RX ORDER — FENTANYL CITRATE 50 UG/ML
INJECTION, SOLUTION INTRAMUSCULAR; INTRAVENOUS PRN
Status: DISCONTINUED | OUTPATIENT
Start: 2025-06-10 | End: 2025-06-10 | Stop reason: HOSPADM

## 2025-06-10 RX ORDER — TAMSULOSIN HYDROCHLORIDE 0.4 MG/1
0.4 CAPSULE ORAL DAILY
Qty: 30 CAPSULE | Refills: 5 | Status: SHIPPED | OUTPATIENT
Start: 2025-06-10

## 2025-06-10 RX ORDER — SODIUM CHLORIDE 9 MG/ML
INJECTION, SOLUTION INTRAVENOUS PRN
Status: DISCONTINUED | OUTPATIENT
Start: 2025-06-10 | End: 2025-06-11 | Stop reason: HOSPADM

## 2025-06-10 RX ORDER — LOSARTAN POTASSIUM 25 MG/1
100 TABLET ORAL DAILY
Status: DISCONTINUED | OUTPATIENT
Start: 2025-06-10 | End: 2025-06-11 | Stop reason: HOSPADM

## 2025-06-10 RX ORDER — ACETAMINOPHEN 325 MG/1
650 TABLET ORAL EVERY 4 HOURS PRN
Status: DISCONTINUED | OUTPATIENT
Start: 2025-06-10 | End: 2025-06-10 | Stop reason: SDUPTHER

## 2025-06-10 RX ORDER — GLIPIZIDE 5 MG/1
10 TABLET ORAL DAILY
Status: DISCONTINUED | OUTPATIENT
Start: 2025-06-10 | End: 2025-06-11 | Stop reason: HOSPADM

## 2025-06-10 RX ORDER — HEPARIN SODIUM 1000 [USP'U]/ML
INJECTION, SOLUTION INTRAVENOUS; SUBCUTANEOUS PRN
Status: DISCONTINUED | OUTPATIENT
Start: 2025-06-10 | End: 2025-06-10 | Stop reason: HOSPADM

## 2025-06-10 RX ORDER — INSULIN LISPRO 100 [IU]/ML
0-4 INJECTION, SOLUTION INTRAVENOUS; SUBCUTANEOUS
Status: DISCONTINUED | OUTPATIENT
Start: 2025-06-10 | End: 2025-06-11 | Stop reason: HOSPADM

## 2025-06-10 RX ORDER — SODIUM CHLORIDE 0.9 % (FLUSH) 0.9 %
5-40 SYRINGE (ML) INJECTION EVERY 12 HOURS SCHEDULED
Status: DISCONTINUED | OUTPATIENT
Start: 2025-06-10 | End: 2025-06-11 | Stop reason: HOSPADM

## 2025-06-10 RX ORDER — ATORVASTATIN CALCIUM 40 MG/1
40 TABLET, FILM COATED ORAL DAILY
Status: DISCONTINUED | OUTPATIENT
Start: 2025-06-10 | End: 2025-06-11 | Stop reason: HOSPADM

## 2025-06-10 RX ORDER — ACETAMINOPHEN 325 MG/1
650 TABLET ORAL EVERY 6 HOURS PRN
Status: DISCONTINUED | OUTPATIENT
Start: 2025-06-10 | End: 2025-06-11 | Stop reason: HOSPADM

## 2025-06-10 RX ORDER — AMLODIPINE BESYLATE 5 MG/1
5 TABLET ORAL DAILY
Status: DISCONTINUED | OUTPATIENT
Start: 2025-06-10 | End: 2025-06-11 | Stop reason: HOSPADM

## 2025-06-10 RX ORDER — ACETAMINOPHEN 650 MG/1
650 SUPPOSITORY RECTAL EVERY 6 HOURS PRN
Status: DISCONTINUED | OUTPATIENT
Start: 2025-06-10 | End: 2025-06-11 | Stop reason: HOSPADM

## 2025-06-10 RX ORDER — DEXTROSE MONOHYDRATE 100 MG/ML
INJECTION, SOLUTION INTRAVENOUS CONTINUOUS PRN
Status: DISCONTINUED | OUTPATIENT
Start: 2025-06-10 | End: 2025-06-10 | Stop reason: SDUPTHER

## 2025-06-10 RX ORDER — SODIUM CHLORIDE 0.9 % (FLUSH) 0.9 %
5-40 SYRINGE (ML) INJECTION EVERY 12 HOURS SCHEDULED
Status: DISCONTINUED | OUTPATIENT
Start: 2025-06-10 | End: 2025-06-10 | Stop reason: HOSPADM

## 2025-06-10 RX ADMIN — SODIUM CHLORIDE: 0.9 INJECTION, SOLUTION INTRAVENOUS at 18:12

## 2025-06-10 RX ADMIN — LOSARTAN POTASSIUM 100 MG: 25 TABLET, FILM COATED ORAL at 17:41

## 2025-06-10 RX ADMIN — SODIUM CHLORIDE, PRESERVATIVE FREE 10 ML: 5 INJECTION INTRAVENOUS at 17:42

## 2025-06-10 RX ADMIN — GLIPIZIDE 10 MG: 5 TABLET ORAL at 17:41

## 2025-06-10 RX ADMIN — ASPIRIN 81 MG: 81 TABLET, DELAYED RELEASE ORAL at 17:41

## 2025-06-10 RX ADMIN — ATORVASTATIN CALCIUM 40 MG: 40 TABLET, FILM COATED ORAL at 17:41

## 2025-06-10 RX ADMIN — TAMSULOSIN HYDROCHLORIDE 0.4 MG: 0.4 CAPSULE ORAL at 17:41

## 2025-06-10 RX ADMIN — AMLODIPINE BESYLATE 5 MG: 5 TABLET ORAL at 17:41

## 2025-06-10 ASSESSMENT — PAIN - FUNCTIONAL ASSESSMENT
PAIN_FUNCTIONAL_ASSESSMENT: 0-10

## 2025-06-10 ASSESSMENT — ENCOUNTER SYMPTOMS
EYES NEGATIVE: 1
BLOOD IN STOOL: 1

## 2025-06-10 NOTE — PROGRESS NOTES
4 Eyes Skin Assessment     NAME:  Stewart Ramos  YOB: 1955  MEDICAL RECORD NUMBER:  [id number]    The patient is being assessed for  Admission    I agree that at least one RN has performed a thorough Head to Toe Skin Assessment on the patient. ALL assessment sites listed below have been assessed.      Areas assessed by both nurses:    Head, Face, Ears, Shoulders, Back, Chest, Arms, Elbows, Hands, Sacrum. Buttock, Coccyx, Ischium, Legs. Feet and Heels, and Under Medical Devices         Does the Patient have a Wound? No noted wound(s)       Keshawn Prevention initiated by RN: Yes  Wound Care Orders initiated by RN: No    Pressure Injury (Stage 3,4, Unstageable, DTI, NWPT, and Complex wounds) if present, place Wound referral order by RN under : No    New Ostomies, if present place, Ostomy referral order under : No     Nurse 1 eSignature: Electronically signed by Verna Cheng RN on 6/10/25 at 6:22 PM EDT    **SHARE this note so that the co-signing nurse can place an eSignature**    Nurse 2 eSignature: Electronically signed by Kaley Stern RN on 6/10/25 at 6:24 PM EDT

## 2025-06-10 NOTE — PROGRESS NOTES
UROLOGY CONSULT NOTE  [phone]        Patient: Stewart Ramos MRN: [id number]  SSN: [id number]    YOB: 1955  Age: 70 y.o.  Sex: male      Referring Provider: lavell    Assessment:   #1 urinary retention  #2 gross hematuria  #3 stricture             Plan:   #1 Place on Flomax  #2 dilate stricture placed catheter  #3 will need cystoscopy  #4 will need PSA    Patient penis was prepped and draped usual sterile fashion I placed 2 wires down his urethra up into his bladder after twisting 1 the wires back-and-forth that is finally able to get into his bladder he had a scar scar tissue.  Is able to pop the stricture with an 18 Angolan dilator and then placed a 16 Angolan catheter into his bladder.  He did have bloody urine.  I then hooked his catheter to a leg bag and turned him over to the nurse    Subjective:      Stewart Ramos is a 70 y.o. male who is being seen in urologic consultation for urinary retention.  Patient unable to void.  Patient history of CVA with right sided weakness for years.  Patient states he voids once at night and stream is average.  He denies fevers chills flank pain nausea vomiting.  While he was postop he is unable to void 350 cc residual he could not void at all.  He could only void little drops of blood.    Past Medical History:   Diagnosis Date    CVA (cerebral vascular accident) (HCC)     early , right sided weakness    Diabetes mellitus (HCC)     Hepatitis C     Hypertension     Sleep apnea     no CPAP     Past Surgical History:   Procedure Laterality Date    CAROTID ARTERY SURGERY Left       Family History   Problem Relation Age of Onset    No Known Problems Mother     High Blood Pressure Father      Social History     Tobacco Use    Smoking status: Former     Current packs/day: 0.00     Average packs/day: 0.5 packs/day for 36.5 years (18.3 ttl pk-yrs)     Types: Cigarettes     Start date:      Quit date: 2006     Years since quittin.9     Passive

## 2025-06-10 NOTE — H&P
Vascular History and Physical     Patient: Stewart Ramos                    MRN: [id number]          YOB: 1955          Age: 70 y.o.     Sex: male      Chief Complaint:       Chief Complaint   Patient presents with    New Patient       Bilateral leg stents          History of Present Illness: Stewart Ramos is a 70 y.o.  very pleasant man is here today for vascular consult. He is c/o left leg pain. Recent GAGE shows  0.35 on the left ankle    Patient is complaining of the rest pain on the left leg recently has gotten worse.    Patient has a previous other vascular problem including carotid surgery,.  Currently denies any chest pain shortness of breath.  Denies abdominal pain.    Patient is diabetic as well.  Patient currently on also statin medication for cholesterol.  Patient is also on Plavix as well.     Social History:  Social Connections: Not on file         Past Medical History:  Past Medical History        Past Medical History:   Diagnosis Date    CVA (cerebral vascular accident) (HCC)      Diabetes mellitus (HCC)      Hypertension              Surgical History:  Past Surgical History         Past Surgical History:   Procedure Laterality Date    CAROTID ARTERY SURGERY Right              Allergies:  Allergies   No Known Allergies        Current Meds:  Current Facility-Administered Medications          Current Outpatient Medications   Medication Sig Dispense Refill    amLODIPine (NORVASC) 5 MG tablet Take 1 tablet by mouth daily        atorvastatin (LIPITOR) 40 MG tablet Take 1 tablet by mouth daily        clopidogrel (PLAVIX) 75 MG tablet Take 1 tablet by mouth daily        glipiZIDE (GLUCOTROL) 10 MG tablet Take 1 tablet by mouth daily        TRUE METRIX BLOOD GLUCOSE TEST strip USE TWICE DAILY TO CHECH SUGARS        losartan (COZAAR) 100 MG tablet Take 1 tablet by mouth daily        sildenafil (VIAGRA) 100 MG tablet Take 1 tablet by mouth as needed        tadalafil (CIALIS) 20 MG tablet TAKE 1  TABLET BY MOUTH EVERY OTHER DAY AS NEEDED        insulin NPH (NOVOLIN N) 100 UNIT/ML injection vial Inject into the skin        metFORMIN (GLUCOPHAGE) 500 MG tablet Take 1 tablet by mouth 2 times daily (with meals)        aspirin 81 MG EC tablet Take 1 tablet by mouth daily          No current facility-administered medications for this visit.            Review of Systems:  I reviewed the rest of organ systems personally and they are negative.  Pertinent findings discussed in HPI.     Physical Examination     BP (!) 149/63 (BP Site: Left Upper Arm, Patient Position: Sitting, BP Cuff Size: Large Adult)   Pulse 71   Temp 98 °F (36.7 °C) (Oral)   Resp 17   Ht 1.753 m (5' 9\")   Wt 90 kg (198 lb 8 oz)   SpO2 98%   BMI 29.31 kg/m²   Gen: Well developed, well nourished 70 y.o. male in no acute distress  Head: normocephalic, atraumatic  Mouth: Clear, no overt lesions, oral mucosa pink and moist  Neck: supple, no masses, no adenopathy or carotid bruits, trachea midline  Resp: clear to auscultation bilaterally, no wheeze, rhonchi or rales, excursions normal and symmetrical  Cardio: Regular rate and rhythm, no murmurs, clicks, gallops or rubs, no edema or varicosities  Abdomen: soft, nontender, nondistended, normoactive bowel sounds, no hernias, no hepatosplenomegaly   Neuro: sensation and strength grossly intact and symmetrical  Psych: alert and oriented to person, place and time  Vascular examination: non palpable pedal pulse noted  Skin: warm and moist.     Labs:          Hospital Outpatient Visit on 04/01/2025   Component Date Value Ref Range Status    Right arm BP 04/01/2025 153  mmHg Final    Right posterior tibial 04/01/2025 126  mmHg Final    Right GAGE 04/01/2025 0.81    Final    Right toe pressure 04/01/2025 106  mmHg Final    Right TBI 04/01/2025 0.68    Final    Left arm BP 04/01/2025 155  mmHg Final    Left posterior tibial 04/01/2025 54  mmHg Final    Left GAGE 04/01/2025 0.35    Final    Left toe pressure  04/01/2025 98  mmHg Final    Left TBI 04/01/2025 0.63    Final            Images:        Marcelo Maldonado MD     Assessments:  Problem List       Patient Active Problem List   Diagnosis    Cerebral infarction (HCC)    Atherosclerosis of native artery of extremity            Plans: Clinical examination shows nonpalpable pedal pulse noted bilateral lower extremity.  Monophasic noted.  Bilateral DP.  No signs of ischemia.  GAGE examination discussed with patient as well.  Patient was discussed about PAD in general risk factors involved as well.  Pt was discussed findings and discussed options of continue conservative management of pad vs intervention. Pt is tentatively scheduled for left leg angiogram on 6/10/2025        **Note: This tiffanie is pertinent to patient with PAD.    Vascular risk factor modification regimen:  5 regimens include: Optimal cholesterol control, exercise program, medication modifications including antiplatelet therapy and antihypertensives, optimal blood pressure control, and optimal hydration.  6 regimens include: 5 regimens plus tobacco cessation.  7 regimens include: 5 regimens plus strict diabetic control.  8 regimens include: 6 plus 7 regimens     Marcelo Maldonado MD

## 2025-06-10 NOTE — PERIOP NOTE
Pt transferred to [facility] for observation. BSSR given to Verna SANTOS. Pt condition stable at time of handoff. Family notified of patient transfer.

## 2025-06-10 NOTE — H&P
Hospitalist Admission Note    NAME: Stewart Ramos   :  1955   MRN:  [id number]     Date/Time:  6/10/2025 3:54 PM    Patient PCP: Roel Benítez MD    ______________________________________________________________________  Given the patient's current clinical presentation, I have a high level of concern for decompensation if discharged from the emergency department.  Complex decision making was performed, which includes reviewing the patient's available past medical records, laboratory results, and x-ray films.       My assessment of this patient's clinical condition and my plan of care is as follows.    Assessment / Plan:    Acute urinary retention secondary to suspected urethral stricture  Gross hematuria likely traumatic  History of CVA with dysarthria  - Canales placed on 6/10  - Obtain labs stat  - Urology consulted, will need cystoscopy and PSA  - Hemodynamically patient is stable  - Needs to be on Plavix as benefit outweighs risk as he has peripheral vascular disease  - Monitor for hematuria and anemia  - Will transfuse if less than 7    Peripheral vascular disease  -Status post angiogram today  - Continue statin and Plavix      Hypertension  -Continue home meds    Diabetes mellitus  -Continue home meds  - Hypoglycemic protocol, to avoid hypoglycemia      Code Status: Full   DVT Prophylaxis: SCDs   GI Prophylaxis: not indicated     Medical Decision Making     [x] High (any 2)     A. Problems (any 1)  [x] Acute/Chronic Illness/injury posing threat to life or bodily function:    [] Severe exacerbation of chronic illness:    ---------------------------------------------------------------------  B. Risk of Treatment (any 1)   [] Drugs/treatments that require intensive monitoring for toxicity include:    [] IV ABX requiring serial renal monitoring for nephrotoxicity:     [] IV Narcotic analgesia for adverse drug reaction  [] Aggressive IV diuresis requiring serial monitoring for renal impairment and  1 tablet by mouth daily    Chalo Dumont MD   TRUE METRIX BLOOD GLUCOSE TEST strip USE TWICE DAILY TO CHECH SUGARS 25   Chalo Dumont MD   tadalafil (CIALIS) 20 MG tablet TAKE 1 TABLET BY MOUTH EVERY OTHER DAY AS NEEDED  Patient not taking: Reported on 6/10/2025 2/1/25   Chalo Dumont MD   metFORMIN (GLUCOPHAGE) 500 MG tablet Take 1 tablet by mouth 2 times daily (with meals)    Chalo Dumont MD         Objective:   VITALS:    Patient Vitals for the past 24 hrs:   BP Temp Temp src Pulse Resp SpO2 Height Weight   06/10/25 1140 (!) 154/63 -- -- 65 16 94 % -- --   06/10/25 1030 (!) 141/72 97.5 °F (36.4 °C) Oral 65 16 93 % -- --   06/10/25 1015 135/79 97.2 °F (36.2 °C) Oral 70 18 95 % -- --   06/10/25 1000 126/69 -- -- 64 17 94 % -- --   06/10/25 0945 132/67 -- -- 63 14 94 % -- --   06/10/25 0930 (!) 154/81 -- -- 62 14 96 % -- --   06/10/25 0917 (!) 155/78 97.1 °F (36.2 °C) Oral 66 19 97 % -- --   06/10/25 0635 (!) 155/73 98.1 °F (36.7 °C) Oral 64 18 96 % 1.702 m (5' 7\") 87.1 kg (192 lb)       Temp (24hrs), Av.5 °F (36.4 °C), Min:97.1 °F (36.2 °C), Max:98.1 °F (36.7 °C)           Wt Readings from Last 12 Encounters:   06/10/25 87.1 kg (192 lb)   25 87.5 kg (192 lb 12.8 oz)   25 90 kg (198 lb 8 oz)   25 95.3 kg (210 lb)   24 95.3 kg (210 lb)       Review of Systems   HENT: Negative.     Eyes: Negative.    Genitourinary:  Positive for difficulty urinating and urgency.        Kilo hematuria        PHYSICAL EXAM:  Physical Exam      General: alert, awake, no acute distress.  HEENT: EOMI, no icterus, no pallor, pupils reactive, mucosa moist.   Neck: Neck is supple, No JVD.  Lungs: breathsounds normal, no respiratory distress on inspection.  CVS: S1, S2 heard, no murmurs, gallops, no JVD, no LE edema  GI: soft, nontender, normal BS.  : Canales attached, gross hematuria noted  Extremities: no clubbing, no cyanosis, no edema.  Neuro: Alert, awake, oriented x3,  some dysarthria, right-sided facial droop, upper lower extremity strength, moving spontaneously  Skin: normal skin turgor, no skin rashes or ulcerations.  Musculoskeletal: no acute joint swellings.            LAB DATA REVIEWED:    Recent Results (from the past 12 hours)   POCT Glucose    Collection Time: 06/10/25  7:02 AM   Result Value Ref Range    POC Glucose 145 (H) 65 - 100 mg/dL    Performed by: NATALYA Farias    Invasive vascular procedure    Collection Time: 06/10/25  9:15 AM   Result Value Ref Range    Body Surface Area 2.03 m2   POCT Glucose    Collection Time: 06/10/25  9:21 AM   Result Value Ref Range    POC Glucose 149 (H) 65 - 100 mg/dL    Performed by: Pedro Brown          CT Result (most recent):  CTA ABDOMEN PELVIS W WO CONTRAST 02/25/2025    Addendum 3/11/2025  3:11 PM  Addendum:  Correction to Technique:  Multislice helical CT was performed through  the abdomen and pelvis prior to and after uneventful rapid bolus intravenous  contrast administration in unenhanced, arterial, portal venous, and delayed  phases. Oral contrast was not administered. Contiguous 5 mm axial images were  reconstructed and lung and soft tissue windows were generated. Coronal and  sagittal reformations were generated. Coronal MIP images generated.  CT dose  reduction was achieved through use of a standardized protocol tailored for this  examination and automatic exposure control for dose modulation.  No change in  Findings or IMPRESSION.      Electronically signed by Naveen Hinojosa    Narrative  EXAM: CTA ABDOMEN PELVIS W WO CONTRAST    INDICATION:  bright red blood per rectum    COMPARISON: None.    TECHNIQUE: Multislice helical CT was performed through the abdomen and pelvis  prior to and after uneventful rapid bolus intravenous contrast administration in  unenhanced, arterial, portal venous, and delayed phases. Oral contrast was not  administered. Contiguous 5 mm axial images were reconstructed and lung and

## 2025-06-10 NOTE — PROGRESS NOTES
Dr. DEIRDRE Mcnally at bedside to assess whether patient needs to stay overnight for observation.

## 2025-06-10 NOTE — PROGRESS NOTES
Pt is being admitted for hematuria overnight for observation  per Dr. Marcelo Khan came and inserted a del valle catheter and patient had cherry red urine with medium size clots. Patient stated he had no issues in the past urinating and has never had a del valle catheter.   Called  about replacing leg bag with standard bag he agreed. Standard bag placed.  Groin site was clean,dry and ,intact at time of transfer and spoke with son-in-law about new room assignment and 4 digit code for patient. Family verbalized understanding.

## 2025-06-10 NOTE — PERIOP NOTE
Notified Dr. Robertson about bloody urine after del valle cath, to check and see whether or not he still wanted patient to have plavix, he stated to hold plavix and put in a consult for the hospitalist for patient for hematuria.

## 2025-06-11 VITALS
DIASTOLIC BLOOD PRESSURE: 69 MMHG | BODY MASS INDEX: 30.13 KG/M2 | TEMPERATURE: 97.9 F | WEIGHT: 192 LBS | OXYGEN SATURATION: 94 % | HEIGHT: 67 IN | HEART RATE: 69 BPM | RESPIRATION RATE: 18 BRPM | SYSTOLIC BLOOD PRESSURE: 142 MMHG

## 2025-06-11 LAB
ANION GAP SERPL CALC-SCNC: 9 MMOL/L (ref 2–12)
BASOPHILS # BLD: 0.03 K/UL (ref 0–0.1)
BASOPHILS NFR BLD: 0.3 % (ref 0–1)
BUN SERPL-MCNC: 11 MG/DL (ref 6–20)
BUN/CREAT SERPL: 15 (ref 12–20)
CA-I BLD-MCNC: 9.2 MG/DL (ref 8.5–10.1)
CHLORIDE SERPL-SCNC: 111 MMOL/L (ref 97–108)
CO2 SERPL-SCNC: 23 MMOL/L (ref 21–32)
CREAT SERPL-MCNC: 0.73 MG/DL (ref 0.7–1.3)
DIFFERENTIAL METHOD BLD: ABNORMAL
EOSINOPHIL # BLD: 0.09 K/UL (ref 0–0.4)
EOSINOPHIL NFR BLD: 0.9 % (ref 0–7)
ERYTHROCYTE [DISTWIDTH] IN BLOOD BY AUTOMATED COUNT: 13 % (ref 11.5–14.5)
GLUCOSE BLD STRIP.AUTO-MCNC: 156 MG/DL (ref 65–100)
GLUCOSE SERPL-MCNC: 173 MG/DL (ref 65–100)
HCT VFR BLD AUTO: 42 % (ref 36.6–50.3)
HGB BLD-MCNC: 14.3 G/DL (ref 12.1–17)
IMM GRANULOCYTES # BLD AUTO: 0.03 K/UL (ref 0–0.04)
IMM GRANULOCYTES NFR BLD AUTO: 0.3 % (ref 0–0.5)
LYMPHOCYTES # BLD: 1.29 K/UL (ref 0.8–3.5)
LYMPHOCYTES NFR BLD: 12.8 % (ref 12–49)
MAGNESIUM SERPL-MCNC: 1.9 MG/DL (ref 1.6–2.4)
MCH RBC QN AUTO: 28.8 PG (ref 26–34)
MCHC RBC AUTO-ENTMCNC: 34 G/DL (ref 30–36.5)
MCV RBC AUTO: 84.5 FL (ref 80–99)
MONOCYTES # BLD: 0.63 K/UL (ref 0–1)
MONOCYTES NFR BLD: 6.2 % (ref 5–13)
NEUTS SEG # BLD: 8.03 K/UL (ref 1.8–8)
NEUTS SEG NFR BLD: 79.5 % (ref 32–75)
NRBC # BLD: 0 K/UL (ref 0–0.01)
NRBC BLD-RTO: 0 PER 100 WBC
PERFORMED BY:: ABNORMAL
PHOSPHATE SERPL-MCNC: 2.5 MG/DL (ref 2.6–4.7)
PLATELET # BLD AUTO: 263 K/UL (ref 150–400)
PMV BLD AUTO: 11.1 FL (ref 8.9–12.9)
POTASSIUM SERPL-SCNC: 3.3 MMOL/L (ref 3.5–5.1)
RBC # BLD AUTO: 4.97 M/UL (ref 4.1–5.7)
SODIUM SERPL-SCNC: 143 MMOL/L (ref 136–145)
WBC # BLD AUTO: 10.1 K/UL (ref 4.1–11.1)

## 2025-06-11 PROCEDURE — 83735 ASSAY OF MAGNESIUM: CPT

## 2025-06-11 PROCEDURE — 80048 BASIC METABOLIC PNL TOTAL CA: CPT

## 2025-06-11 PROCEDURE — 2580000003 HC RX 258: Performed by: SURGERY

## 2025-06-11 PROCEDURE — 99232 SBSQ HOSP IP/OBS MODERATE 35: CPT | Performed by: SURGERY

## 2025-06-11 PROCEDURE — 6370000000 HC RX 637 (ALT 250 FOR IP)

## 2025-06-11 PROCEDURE — 2580000003 HC RX 258

## 2025-06-11 PROCEDURE — 82962 GLUCOSE BLOOD TEST: CPT

## 2025-06-11 PROCEDURE — 84100 ASSAY OF PHOSPHORUS: CPT

## 2025-06-11 PROCEDURE — 36415 COLL VENOUS BLD VENIPUNCTURE: CPT

## 2025-06-11 PROCEDURE — 2500000003 HC RX 250 WO HCPCS

## 2025-06-11 PROCEDURE — 85025 COMPLETE CBC W/AUTO DIFF WBC: CPT

## 2025-06-11 RX ADMIN — SODIUM CHLORIDE, PRESERVATIVE FREE 10 ML: 5 INJECTION INTRAVENOUS at 09:06

## 2025-06-11 RX ADMIN — ATORVASTATIN CALCIUM 40 MG: 40 TABLET, FILM COATED ORAL at 09:05

## 2025-06-11 RX ADMIN — GLIPIZIDE 10 MG: 5 TABLET ORAL at 09:05

## 2025-06-11 RX ADMIN — SODIUM CHLORIDE: 0.9 INJECTION, SOLUTION INTRAVENOUS at 04:31

## 2025-06-11 RX ADMIN — LOSARTAN POTASSIUM 100 MG: 25 TABLET, FILM COATED ORAL at 09:05

## 2025-06-11 RX ADMIN — TAMSULOSIN HYDROCHLORIDE 0.4 MG: 0.4 CAPSULE ORAL at 09:05

## 2025-06-11 RX ADMIN — AMLODIPINE BESYLATE 5 MG: 5 TABLET ORAL at 09:05

## 2025-06-11 RX ADMIN — ASPIRIN 81 MG: 81 TABLET, DELAYED RELEASE ORAL at 09:05

## 2025-06-11 RX ADMIN — POTASSIUM PHOSPHATE, MONOBASIC POTASSIUM PHOSPHATE, DIBASIC 15 MMOL: 224; 236 INJECTION, SOLUTION, CONCENTRATE INTRAVENOUS at 09:04

## 2025-06-11 NOTE — CARE COORDINATION
06/11/25 1018   Service Assessment   Patient Orientation Alert and Oriented   Cognition Alert   History Provided By Medical Record   Primary Caregiver Self   Accompanied By/Relationship alone   Support Systems Family Members   Patient's Healthcare Decision Maker is: Named in Scanned ACP Document   PCP Verified by CM Yes   Last Visit to PCP Within last year   Prior Functional Level Independent in ADLs/IADLs   Current Functional Level Independent in ADLs/IADLs   Can patient return to prior living arrangement Yes   Ability to make needs known: Good   Family able to assist with home care needs: Yes   Would you like for me to discuss the discharge plan with any other family members/significant others, and if so, who? Yes   Financial Resources Medicare   Community Resources None     Discharging home today.    Advance Care Planning   Healthcare Decision Maker:      Click here to complete Healthcare Decision Makers including selection of the Healthcare Decision Maker Relationship (ie \"Primary\").

## 2025-06-11 NOTE — PROGRESS NOTES
PROGRESS NOTE      Chief Complaints:  No new issues.  HPI and  Objective:    Right groin looks okay.  Hematuria resolving.  Review of Systems:  Rest of review of system reviewed personally and they are negative.    EXAM:  BP (!) 142/69 Comment: RN NOTIFIED  Pulse 69   Temp 97.9 °F (36.6 °C) (Oral)   Resp 18   Ht 1.702 m (5' 7\")   Wt 87.1 kg (192 lb)   SpO2 92%   BMI 30.07 kg/m²     Patient is awake   Head and neck atraumatic, normocephalic.  ENT: No hoarse voice, gaze appropriate.  Cardiac system regular rate rhythm.  Pulmonary no audible wheeze, no cyanosis.  Chest wall excursion normal with respiration cycle  Abdomen is soft not particularly distended.  Neurologically nonfocal.  Hematology system: No bruising noted.  Musculoskeletal system: No joint deformity noted.  Genitourinary system: No hematuria noted.  Skin is warm and moist.  Psychosocial: Cooperative.  Vascular examination as previously noted no changes.    Current Facility-Administered Medications   Medication Dose Route Frequency Provider Last Rate Last Admin    potassium phosphate 15 mmol in sodium chloride 0.9 % 250 mL IVPB  15 mmol IntraVENous Once Purvi Mcnally MD 62.5 mL/hr at 06/11/25 0904 15 mmol at 06/11/25 0904    0.9 % sodium chloride infusion   IntraVENous Continuous Joel Robertson  mL/hr at 06/11/25 0431 New Bag at 06/11/25 0431    sodium chloride flush 0.9 % injection 5-40 mL  5-40 mL IntraVENous 2 times per day Purvi Mcnally MD   10 mL at 06/11/25 0906    sodium chloride flush 0.9 % injection 5-40 mL  5-40 mL IntraVENous PRN Purvi Mcnally MD        0.9 % sodium chloride infusion   IntraVENous PRN Purvi Mcnally MD        potassium chloride (KLOR-CON M) extended release tablet 40 mEq  40 mEq Oral PRN Purvi Mcnally MD        Or    potassium bicarb-citric acid (EFFER-K) effervescent tablet 40 mEq  40 mEq Oral PRN Purvi Mcnally MD        Or    potassium chloride 10 mEq/100 mL IVPB (Peripheral Line)  10 mEq  Monocytes % 6.2 5.0 - 13.0 %    Eosinophils % 0.9 0.0 - 7.0 %    Basophils % 0.3 0.0 - 1.0 %    Immature Granulocytes % 0.3 0 - 0.5 %    Neutrophils Absolute 8.03 (H) 1.80 - 8.00 K/UL    Lymphocytes Absolute 1.29 0.80 - 3.50 K/UL    Monocytes Absolute 0.63 0.00 - 1.00 K/UL    Eosinophils Absolute 0.09 0.00 - 0.40 K/UL    Basophils Absolute 0.03 0.00 - 0.10 K/UL    Immature Granulocytes Absolute 0.03 0.00 - 0.04 K/UL    Differential Type AUTOMATED     Basic Metabolic Panel    Collection Time: 06/11/25 12:00 AM   Result Value Ref Range    Sodium 143 136 - 145 mmol/L    Potassium 3.3 (L) 3.5 - 5.1 mmol/L    Chloride 111 (H) 97 - 108 mmol/L    CO2 23 21 - 32 mmol/L    Anion Gap 9 2 - 12 mmol/L    Glucose 173 (H) 65 - 100 mg/dL    BUN 11 6 - 20 mg/dL    Creatinine 0.73 0.70 - 1.30 mg/dL    BUN/Creatinine Ratio 15 12 - 20      Est, Glom Filt Rate >90 >60 ml/min/1.73m2    Calcium 9.2 8.5 - 10.1 mg/dL   Magnesium    Collection Time: 06/11/25 12:00 AM   Result Value Ref Range    Magnesium 1.9 1.6 - 2.4 mg/dL   Phosphorus    Collection Time: 06/11/25 12:00 AM   Result Value Ref Range    Phosphorus 2.5 (L) 2.6 - 4.7 mg/dL   POCT Glucose    Collection Time: 06/11/25  8:36 AM   Result Value Ref Range    POC Glucose 156 (H) 65 - 100 mg/dL    Performed by: Sang Campbell        ASSESSMENT:   Patient is 70 y.o. with diagnosis of : Principal Problem:    Left leg pain  Active Problems:    PAD (peripheral artery disease)    Hematuria  Resolved Problems:    * No resolved hospital problems. *      PLAN:                 From vascular standpoint, patient can be discharged.  Follow urology.    And then will see him back my office 2 weeks follow-up.  Resume Plavix.

## 2025-06-11 NOTE — DISCHARGE SUMMARY
.                                       Discharge Summary    Name: Stewart Ramos  [id number]  YOB: 1955 (Age: 70 y.o.)   Date of Admission: 6/10/2025  Date of Discharge: 6/11/2025  Attending Physician: Purvi Mcnally MD    Discharge Diagnosis:   Acute urinary retention  Gross hematuria  History of stricture  Status post left angiogram  Peripheral arterial disease  CVA with dysarthria  Diabetes mellitus  Hypertension  Hyperlipidemia       Consultations:  IP CONSULT TO UROLOGY  IP CONSULT TO HOSPITALIST      Brief Hospital Course by Main Problems:     Stewart Ramos is a 70 y.o.  male with PMHx significant for 70-year-old male with past medical history of CVA, peripheral vascular disease,hyperlipidemia, diabetes mellitus, hypertension presented to same-day surgery under vascular surgeon to undergo left angiogram today.  Postoperatively patient developed acute urinary retention required Canlaes catheter placemen on 6/10.  Postplacement of Canales catheter patient noted to have desmond hematuria for which hospitalist was consulted.     During my evaluation, patient expresses frustration.  Has been here since this a.m.  Was planning to go home with the Canales in.  He is distraught that he has to stay.  Explained given his hematuria important to monitor his hemoglobin.  Patient is agreeable to staying.  Patient has no other symptoms.  Denies any previous history of instrumentation, is very surprised that he has possible ureteral stricture.  Overnight, patient's hemoglobin remained stable.  Patient to continue on DAPT.  Gross hematuria improved.  Hypokalemia hypophosphatemia.  Which was repleted.  Patient otherwise medically stable to be discharged home and follow-up with urology and vascular surgeon and PCP.      Discharge Exam:  Patient seen and examined by me on discharge day.  Pertinent Findings:  Patient Vitals for the past 24 hrs:   BP Temp Temp src Pulse Resp SpO2   06/11/25 0810 (!) 142/69 97.9 °F (36.6 °C)  Oral 69 18 --   06/11/25 0701 -- -- -- 67 -- --   06/11/25 0415 112/72 98.2 °F (36.8 °C) Oral 72 16 92 %   06/11/25 0102 -- -- -- 69 -- --   06/11/25 0032 118/76 98.2 °F (36.8 °C) Axillary 75 18 93 %   06/10/25 2300 129/83 98.8 °F (37.1 °C) Oral 80 16 94 %   06/10/25 2011 (!) 167/76 97.9 °F (36.6 °C) Oral 77 18 92 %   06/10/25 1719 (!) 168/77 -- -- 80 16 94 %   06/10/25 1140 (!) 154/63 -- -- 65 16 94 %   06/10/25 1030 (!) 141/72 97.5 °F (36.4 °C) Oral 65 16 93 %   06/10/25 1015 135/79 97.2 °F (36.2 °C) Oral 70 18 95 %   06/10/25 1000 126/69 -- -- 64 17 94 %   06/10/25 0945 132/67 -- -- 63 14 94 %   06/10/25 0930 (!) 154/81 -- -- 62 14 96 %   06/10/25 0917 (!) 155/78 97.1 °F (36.2 °C) Oral 66 19 97 %       Gen:    Not in distress  Chest: Clear lungs  CVS:   Regular rhythm.  No edema  Abd:  Soft, not distended, not tender  Neuro:  AAX 4   : Canales placed in     Discharge/Recent Laboratory Results:  Recent Labs     06/11/25  0000      K 3.3*   *   CO2 23   BUN 11   CREATININE 0.73   GLUCOSE 173*   CALCIUM 9.2   PHOS 2.5*   MG 1.9     Recent Labs     06/11/25  0000   HGB 14.3   HCT 42.0   WBC 10.1          Discharge Medications:     Medication List        START taking these medications      tamsulosin 0.4 MG capsule  Commonly known as: FLOMAX  Take 1 capsule by mouth daily            CONTINUE taking these medications      amLODIPine 5 MG tablet  Commonly known as: NORVASC     aspirin 81 MG EC tablet     atorvastatin 40 MG tablet  Commonly known as: LIPITOR     clopidogrel 75 MG tablet  Commonly known as: PLAVIX  Take 1 tablet by mouth daily     glipiZIDE 10 MG tablet  Commonly known as: GLUCOTROL     losartan 100 MG tablet  Commonly known as: COZAAR     metFORMIN 500 MG tablet  Commonly known as: GLUCOPHAGE     NovoLIN N 100 UNIT/ML injection vial  Generic drug: insulin NPH     True Metrix Blood Glucose Test strip  Generic drug: blood glucose test strips            ASK your doctor about these  medications      tadalafil 20 MG tablet  Commonly known as: CIALIS               Where to Get Your Medications        These medications were sent to [id number] - [place], [place] - [address] - P [phone] - F [phone] [address], [place] [address]      Phone: [phone]   clopidogrel 75 MG tablet  tamsulosin 0.4 MG capsule             DISPOSITION:    Home with Family:    XX   Home with HH/PT/OT/RN:    SNF/LTC:    BRINDA:    OTHER:            Code status: Full   Recommended diet: regular diet  Recommended activity: activity as tolerated  Wound care: None      Follow up with:     Joel Robertson MD  [address] [facility] [address] [phone]    Follow up in 2 week(s)      Dash Khan MD  [address] [facility] [address] [phone]    Follow up  Follow-up tomorrow (Wednesday, June 11th), 2025 at 9am.          Total time in minutes spent coordinating this discharge (includes going over instructions, follow-up, prescriptions, and preparing report for sign off to her PCP) :  35 minutes

## 2025-06-11 NOTE — PLAN OF CARE
Problem: Pain  Goal: Verbalizes/displays adequate comfort level or baseline comfort level  6/11/2025 1008 by Verna Cheng RN  Outcome: Progressing  6/11/2025 0141 by Fabiano Weiss RN  Outcome: Progressing     Problem: Safety - Adult  Goal: Free from fall injury  6/11/2025 1008 by Verna Cheng RN  Outcome: Progressing  6/11/2025 0141 by Fabiano Weiss RN  Outcome: Progressing     Problem: Chronic Conditions and Co-morbidities  Goal: Patient's chronic conditions and co-morbidity symptoms are monitored and maintained or improved  Outcome: Progressing     Problem: Discharge Planning  Goal: Discharge to home or other facility with appropriate resources  6/11/2025 1008 by Verna Cheng RN  Outcome: Progressing  6/11/2025 0141 by Fabiano Weiss RN  Outcome: Progressing

## 2025-06-11 NOTE — CARE COORDINATION
Transition of Care Plan:    RUR: 9%  Prior Level of Functioning: ind  Disposition: home  GURWINDER: today  If SNF or IPR: Date FOC offered: na  Date FOC received: na  Accepting facility: na  Date authorization started with reference number:na   Date authorization received and expires: na  Follow up appointments:   DME needed:   Transportation at discharge: family  IM/IMM Medicare/ letter given: 6/11  Is patient a [facility] and connected with [facility]? na   If yes, was  transfer form completed and [facility] notified? na  Caregiver Contact: at bedside  Discharge Caregiver contacted prior to discharge? At bedside  Care Conference needed? na  Barriers to discharge: na

## 2025-06-14 PROBLEM — R33.9 URINARY RETENTION: Status: ACTIVE | Noted: 2025-06-14

## 2025-06-14 PROBLEM — N35.919 URETHRAL STRICTURE: Status: ACTIVE | Noted: 2025-06-14

## 2025-06-15 PROBLEM — I70.202 FEMORAL ARTERY STENOSIS, LEFT: Status: ACTIVE | Noted: 2025-06-15

## 2025-06-15 LAB — ECHO BSA: 2.03 M2

## 2025-06-16 ENCOUNTER — OFFICE VISIT (OUTPATIENT)
Age: 70
End: 2025-06-16
Payer: MEDICARE

## 2025-06-16 VITALS
WEIGHT: 192 LBS | HEART RATE: 87 BPM | RESPIRATION RATE: 18 BRPM | OXYGEN SATURATION: 96 % | SYSTOLIC BLOOD PRESSURE: 114 MMHG | HEIGHT: 67 IN | BODY MASS INDEX: 30.13 KG/M2 | DIASTOLIC BLOOD PRESSURE: 66 MMHG

## 2025-06-16 DIAGNOSIS — N30.90 CYSTITIS: ICD-10-CM

## 2025-06-16 DIAGNOSIS — R33.9 URINARY RETENTION: ICD-10-CM

## 2025-06-16 DIAGNOSIS — N40.1 BENIGN PROSTATIC HYPERPLASIA WITH URINARY RETENTION: ICD-10-CM

## 2025-06-16 DIAGNOSIS — I63.6 CEREBRAL INFARCTION DUE TO NONPYOGENIC CEREBRAL VENOUS THROMBOSIS (HCC): ICD-10-CM

## 2025-06-16 DIAGNOSIS — N35.919 STRICTURE OF MALE URETHRA, UNSPECIFIED STRICTURE TYPE: ICD-10-CM

## 2025-06-16 DIAGNOSIS — R33.8 BENIGN PROSTATIC HYPERPLASIA WITH URINARY RETENTION: ICD-10-CM

## 2025-06-16 DIAGNOSIS — R31.9 HEMATURIA, UNSPECIFIED TYPE: Primary | ICD-10-CM

## 2025-06-16 PROCEDURE — 1036F TOBACCO NON-USER: CPT | Performed by: UROLOGY

## 2025-06-16 PROCEDURE — 1123F ACP DISCUSS/DSCN MKR DOCD: CPT | Performed by: UROLOGY

## 2025-06-16 PROCEDURE — 3017F COLORECTAL CA SCREEN DOC REV: CPT | Performed by: UROLOGY

## 2025-06-16 PROCEDURE — G8428 CUR MEDS NOT DOCUMENT: HCPCS | Performed by: UROLOGY

## 2025-06-16 PROCEDURE — 1111F DSCHRG MED/CURRENT MED MERGE: CPT | Performed by: UROLOGY

## 2025-06-16 PROCEDURE — 1125F AMNT PAIN NOTED PAIN PRSNT: CPT | Performed by: UROLOGY

## 2025-06-16 PROCEDURE — 51700 IRRIGATION OF BLADDER: CPT | Performed by: UROLOGY

## 2025-06-16 PROCEDURE — G8419 CALC BMI OUT NRM PARAM NOF/U: HCPCS | Performed by: UROLOGY

## 2025-06-16 PROCEDURE — 99214 OFFICE O/P EST MOD 30 MIN: CPT | Performed by: UROLOGY

## 2025-06-16 RX ORDER — HYDROCHLOROTHIAZIDE 12.5 MG/1
12.5 TABLET ORAL DAILY
Status: ON HOLD | COMMUNITY
Start: 2025-06-13 | End: 2025-06-21 | Stop reason: HOSPADM

## 2025-06-16 RX ORDER — TAMSULOSIN HYDROCHLORIDE 0.4 MG/1
0.4 CAPSULE ORAL DAILY
Qty: 30 CAPSULE | Refills: 5 | Status: SHIPPED | OUTPATIENT
Start: 2025-06-16

## 2025-06-16 RX ORDER — METHENAMINE HIPPURATE 1000 MG/1
1 TABLET ORAL 2 TIMES DAILY WITH MEALS
COMMUNITY
Start: 2025-06-13

## 2025-06-16 RX ORDER — PHENAZOPYRIDINE HYDROCHLORIDE 100 MG/1
100 TABLET, FILM COATED ORAL 3 TIMES DAILY PRN
COMMUNITY
Start: 2025-06-13

## 2025-06-16 RX ORDER — DOXYCYCLINE HYCLATE 100 MG
100 TABLET ORAL 2 TIMES DAILY
Qty: 20 TABLET | Refills: 0 | Status: ON HOLD | OUTPATIENT
Start: 2025-06-16 | End: 2025-06-21 | Stop reason: HOSPADM

## 2025-06-16 NOTE — PROGRESS NOTES
Chief Complaint   Patient presents with    New Patient     Cath removal     Everything it hurts , burning, when leaking   Vitals:    06/16/25 1413   BP: 114/66   Pulse: 87   Resp: 18   SpO2: 96%    1. Have you been to the ER, urgent care clinic since your last visit?  Hospitalized since your last visit?no    2. Have you seen or consulted any other health care providers outside of the [facility] since your last visit?  Include any pap smears or colon screening. no

## 2025-06-16 NOTE — PROGRESS NOTES
HISTORY OF PRESENT ILLNESS  Stewart Ramos is a 70 y.o. male   Patient came back for voiding trial.  He was able to void about half was put in still is a little over 100 left over in his bladder.  He does not want a Del Valle.  Will let him go home on doxycycline and Flomax with the understanding that he may end up in the emergency room and that the plan is to scope him on Monday and do urodynamics  1. Hematuria, unspecified type  -     doxycycline hyclate (VIBRA-TABS) 100 MG tablet; Take 1 tablet by mouth 2 times daily for 10 days, Disp-20 tablet, R-0Normal  2. Urinary retention  Overview:  S/p del valle catheter placement and urethral stricture dilation on 6/10/2025  PSA pending  Orders:  -     AMB POC PVR, KASHIF,POST-VOID RES,US,NON-IMAGING  -     AMB POC UROFLOWMETRY  -     tamsulosin (FLOMAX) 0.4 MG capsule; Take 1 capsule by mouth daily, Disp-30 capsule, R-5Normal  3. Stricture of male urethra, unspecified stricture type  Overview:  He is s/p urethral dilation with 18 F on 6/10/2025   Orders:  -     AMB POC PVR, KASHIF,POST-VOID RES,US,NON-IMAGING  -     AMB POC UROFLOWMETRY  4. Cerebral infarction due to nonpyogenic cerebral venous thrombosis (HCC)  Overview:  H/o CVA with right sided weakness for many years  5. Urinary retention [R33.9]  Overview:  S/p del valle catheter placement and urethral stricture dilation on 6/10/2025  PSA pending  Orders:  -     AMB POC PVR, KASHIF,POST-VOID RES,US,NON-IMAGING  -     AMB POC UROFLOWMETRY  -     tamsulosin (FLOMAX) 0.4 MG capsule; Take 1 capsule by mouth daily, Disp-30 capsule, R-5Normal  6. Benign prostatic hyperplasia with urinary retention  -     tamsulosin (FLOMAX) 0.4 MG capsule; Take 1 capsule by mouth daily, Disp-30 capsule, R-5Normal  7. Cystitis  -     doxycycline hyclate (VIBRA-TABS) 100 MG tablet; Take 1 tablet by mouth 2 times daily for 10 days, Disp-20 tablet, R-0Normal        PAST MEDICAL HISTORY  PMHx (including negatives):  has a past medical history of CVA (cerebral

## 2025-06-17 PROCEDURE — 96368 THER/DIAG CONCURRENT INF: CPT

## 2025-06-17 PROCEDURE — 99285 EMERGENCY DEPT VISIT HI MDM: CPT

## 2025-06-18 ENCOUNTER — APPOINTMENT (OUTPATIENT)
Facility: HOSPITAL | Age: 70
DRG: 871 | End: 2025-06-18
Payer: MEDICARE

## 2025-06-18 ENCOUNTER — HOSPITAL ENCOUNTER (INPATIENT)
Facility: HOSPITAL | Age: 70
LOS: 3 days | Discharge: HOME | DRG: 871 | End: 2025-06-21
Attending: STUDENT IN AN ORGANIZED HEALTH CARE EDUCATION/TRAINING PROGRAM | Admitting: FAMILY MEDICINE
Payer: MEDICARE

## 2025-06-18 DIAGNOSIS — E83.42 HYPOMAGNESEMIA: ICD-10-CM

## 2025-06-18 DIAGNOSIS — R29.90 STROKE-LIKE SYMPTOMS: ICD-10-CM

## 2025-06-18 DIAGNOSIS — N30.01 ACUTE CYSTITIS WITH HEMATURIA: ICD-10-CM

## 2025-06-18 DIAGNOSIS — N40.0 ENLARGED PROSTATE: ICD-10-CM

## 2025-06-18 DIAGNOSIS — E87.6 HYPOKALEMIA: ICD-10-CM

## 2025-06-18 DIAGNOSIS — A41.9 SEPSIS, DUE TO UNSPECIFIED ORGANISM, UNSPECIFIED WHETHER ACUTE ORGAN DYSFUNCTION PRESENT (HCC): Primary | ICD-10-CM

## 2025-06-18 LAB
ALBUMIN/GLOB SERPL: ABNORMAL (ref 1.1–2.2)
ALP SERPL-CCNC: 82 U/L (ref 45–117)
ALT SERPL-CCNC: 21 U/L (ref 12–78)
ANION GAP SERPL CALC-SCNC: 9 MMOL/L (ref 2–12)
APPEARANCE UR: ABNORMAL
AST SERPL W P-5'-P-CCNC: 38 U/L (ref 15–37)
BACTERIA URNS QL MICRO: ABNORMAL /HPF
BASOPHILS # BLD: 0.04 K/UL (ref 0–0.1)
BASOPHILS NFR BLD: 0.3 % (ref 0–1)
BILIRUB SERPL-MCNC: 1.1 MG/DL (ref 0.2–1)
BILIRUB UR QL: NEGATIVE
BUN SERPL-MCNC: 19 MG/DL (ref 6–20)
BUN/CREAT SERPL: 16 (ref 12–20)
CA-I BLD-MCNC: 9.1 MG/DL (ref 8.5–10.1)
CHLORIDE SERPL-SCNC: 97 MMOL/L (ref 97–108)
CO2 SERPL-SCNC: 28 MMOL/L (ref 21–32)
COLOR UR: ABNORMAL
CREAT SERPL-MCNC: 1.21 MG/DL (ref 0.7–1.3)
DIFFERENTIAL METHOD BLD: ABNORMAL
EKG ATRIAL RATE: 115 BPM
EKG DIAGNOSIS: NORMAL
EKG P AXIS: 57 DEGREES
EKG P-R INTERVAL: 138 MS
EKG Q-T INTERVAL: 326 MS
EKG QRS DURATION: 100 MS
EKG QTC CALCULATION (BAZETT): 450 MS
EKG R AXIS: -46 DEGREES
EKG T AXIS: 46 DEGREES
EKG VENTRICULAR RATE: 115 BPM
EOSINOPHIL # BLD: 0.02 K/UL (ref 0–0.4)
EOSINOPHIL NFR BLD: 0.1 % (ref 0–7)
EPITH CASTS URNS QL MICRO: ABNORMAL /LPF
ERYTHROCYTE [DISTWIDTH] IN BLOOD BY AUTOMATED COUNT: 13.3 % (ref 11.5–14.5)
GLOBULIN SER CALC-MCNC: ABNORMAL G/DL (ref 2–4)
GLUCOSE BLD STRIP.AUTO-MCNC: 166 MG/DL (ref 65–100)
GLUCOSE BLD STRIP.AUTO-MCNC: 89 MG/DL (ref 65–100)
GLUCOSE SERPL-MCNC: 151 MG/DL (ref 65–100)
GLUCOSE UR STRIP.AUTO-MCNC: NEGATIVE MG/DL
HCT VFR BLD AUTO: 39.2 % (ref 36.6–50.3)
HGB BLD-MCNC: 13.1 G/DL (ref 12.1–17)
HGB UR QL STRIP: ABNORMAL
IMM GRANULOCYTES # BLD AUTO: 0.05 K/UL (ref 0–0.04)
IMM GRANULOCYTES NFR BLD AUTO: 0.3 % (ref 0–0.5)
INR PPP: 1.2 (ref 0.9–1.1)
KETONES UR QL STRIP.AUTO: NEGATIVE MG/DL
LACTATE BLD-SCNC: 1.15 MMOL/L (ref 0.4–2)
LACTATE BLD-SCNC: 1.62 MMOL/L (ref 0.4–2)
LEUKOCYTE ESTERASE UR QL STRIP.AUTO: ABNORMAL
LYMPHOCYTES # BLD: 1.06 K/UL (ref 0.8–3.5)
LYMPHOCYTES NFR BLD: 7 % (ref 12–49)
MAGNESIUM SERPL-MCNC: 1.3 MG/DL (ref 1.6–2.4)
MCH RBC QN AUTO: 28.9 PG (ref 26–34)
MCHC RBC AUTO-ENTMCNC: 33.4 G/DL (ref 30–36.5)
MCV RBC AUTO: 86.5 FL (ref 80–99)
MONOCYTES # BLD: 0.85 K/UL (ref 0–1)
MONOCYTES NFR BLD: 5.6 % (ref 5–13)
NEUTS SEG # BLD: 13.2 K/UL (ref 1.8–8)
NEUTS SEG NFR BLD: 86.7 % (ref 32–75)
NITRITE UR QL STRIP.AUTO: POSITIVE
PERFORMED BY:: ABNORMAL
PERFORMED BY:: NORMAL
PH UR STRIP: 5 (ref 5–8)
PLATELET # BLD AUTO: 217 K/UL (ref 150–400)
PMV BLD AUTO: 10.6 FL (ref 8.9–12.9)
POTASSIUM SERPL-SCNC: 2.7 MMOL/L (ref 3.5–5.1)
PROCALCITONIN SERPL-MCNC: 0.27 NG/ML
PROT SERPL-MCNC: 7.1 G/DL (ref 6.4–8.2)
PROT UR STRIP-MCNC: >300 MG/DL
PROTHROMBIN TIME: 11.6 SEC (ref 9.2–11.2)
RBC # BLD AUTO: 4.53 M/UL (ref 4.1–5.7)
RBC #/AREA URNS HPF: >100 /HPF (ref 0–5)
SODIUM SERPL-SCNC: 134 MMOL/L (ref 136–145)
SP GR UR REFRACTOMETRY: 1.01 (ref 1–1.03)
TROPONIN I SERPL HS-MCNC: 63 NG/L (ref 0–76)
TROPONIN I SERPL HS-MCNC: 66 NG/L (ref 0–76)
URINE CULTURE IF INDICATED: ABNORMAL
UROBILINOGEN UR QL STRIP.AUTO: 1 EU/DL (ref 0.2–1)
WBC # BLD AUTO: 15.2 K/UL (ref 4.1–11.1)
WBC URNS QL MICRO: ABNORMAL /HPF (ref 0–4)

## 2025-06-18 PROCEDURE — 84145 PROCALCITONIN (PCT): CPT

## 2025-06-18 PROCEDURE — 87641 MR-STAPH DNA AMP PROBE: CPT

## 2025-06-18 PROCEDURE — 93005 ELECTROCARDIOGRAM TRACING: CPT | Performed by: STUDENT IN AN ORGANIZED HEALTH CARE EDUCATION/TRAINING PROGRAM

## 2025-06-18 PROCEDURE — 6370000000 HC RX 637 (ALT 250 FOR IP): Performed by: STUDENT IN AN ORGANIZED HEALTH CARE EDUCATION/TRAINING PROGRAM

## 2025-06-18 PROCEDURE — 2500000003 HC RX 250 WO HCPCS: Performed by: STUDENT IN AN ORGANIZED HEALTH CARE EDUCATION/TRAINING PROGRAM

## 2025-06-18 PROCEDURE — 96367 TX/PROPH/DG ADDL SEQ IV INF: CPT

## 2025-06-18 PROCEDURE — 6360000004 HC RX CONTRAST MEDICATION: Performed by: FAMILY MEDICINE

## 2025-06-18 PROCEDURE — 83735 ASSAY OF MAGNESIUM: CPT

## 2025-06-18 PROCEDURE — 80053 COMPREHEN METABOLIC PANEL: CPT

## 2025-06-18 PROCEDURE — 74174 CTA ABD&PLVS W/CONTRAST: CPT

## 2025-06-18 PROCEDURE — 71045 X-RAY EXAM CHEST 1 VIEW: CPT

## 2025-06-18 PROCEDURE — 2580000003 HC RX 258: Performed by: STUDENT IN AN ORGANIZED HEALTH CARE EDUCATION/TRAINING PROGRAM

## 2025-06-18 PROCEDURE — 70496 CT ANGIOGRAPHY HEAD: CPT

## 2025-06-18 PROCEDURE — 6360000004 HC RX CONTRAST MEDICATION: Performed by: STUDENT IN AN ORGANIZED HEALTH CARE EDUCATION/TRAINING PROGRAM

## 2025-06-18 PROCEDURE — 87040 BLOOD CULTURE FOR BACTERIA: CPT

## 2025-06-18 PROCEDURE — 84484 ASSAY OF TROPONIN QUANT: CPT

## 2025-06-18 PROCEDURE — 96365 THER/PROPH/DIAG IV INF INIT: CPT

## 2025-06-18 PROCEDURE — 36415 COLL VENOUS BLD VENIPUNCTURE: CPT

## 2025-06-18 PROCEDURE — 70450 CT HEAD/BRAIN W/O DYE: CPT

## 2025-06-18 PROCEDURE — 85025 COMPLETE CBC W/AUTO DIFF WBC: CPT

## 2025-06-18 PROCEDURE — 83605 ASSAY OF LACTIC ACID: CPT

## 2025-06-18 PROCEDURE — 87154 CUL TYP ID BLD PTHGN 6+ TRGT: CPT

## 2025-06-18 PROCEDURE — 1100000000 HC RM PRIVATE

## 2025-06-18 PROCEDURE — 85610 PROTHROMBIN TIME: CPT

## 2025-06-18 PROCEDURE — 81001 URINALYSIS AUTO W/SCOPE: CPT

## 2025-06-18 PROCEDURE — 82962 GLUCOSE BLOOD TEST: CPT

## 2025-06-18 PROCEDURE — 6360000002 HC RX W HCPCS: Performed by: STUDENT IN AN ORGANIZED HEALTH CARE EDUCATION/TRAINING PROGRAM

## 2025-06-18 PROCEDURE — 51702 INSERT TEMP BLADDER CATH: CPT

## 2025-06-18 RX ORDER — POTASSIUM CHLORIDE 7.45 MG/ML
10 INJECTION INTRAVENOUS
Status: COMPLETED | OUTPATIENT
Start: 2025-06-18 | End: 2025-06-18

## 2025-06-18 RX ORDER — POTASSIUM CHLORIDE 1500 MG/1
40 TABLET, EXTENDED RELEASE ORAL PRN
Status: DISCONTINUED | OUTPATIENT
Start: 2025-06-18 | End: 2025-06-21 | Stop reason: HOSPADM

## 2025-06-18 RX ORDER — DEXTROSE MONOHYDRATE 100 MG/ML
INJECTION, SOLUTION INTRAVENOUS CONTINUOUS PRN
Status: DISCONTINUED | OUTPATIENT
Start: 2025-06-18 | End: 2025-06-21 | Stop reason: HOSPADM

## 2025-06-18 RX ORDER — MAGNESIUM SULFATE IN WATER 40 MG/ML
2000 INJECTION, SOLUTION INTRAVENOUS PRN
Status: DISCONTINUED | OUTPATIENT
Start: 2025-06-18 | End: 2025-06-21 | Stop reason: HOSPADM

## 2025-06-18 RX ORDER — OXYCODONE HYDROCHLORIDE 5 MG/1
5 TABLET ORAL EVERY 4 HOURS PRN
Refills: 0 | Status: DISCONTINUED | OUTPATIENT
Start: 2025-06-18 | End: 2025-06-21 | Stop reason: HOSPADM

## 2025-06-18 RX ORDER — GLIPIZIDE 5 MG/1
10 TABLET ORAL DAILY
Status: DISCONTINUED | OUTPATIENT
Start: 2025-06-18 | End: 2025-06-21 | Stop reason: HOSPADM

## 2025-06-18 RX ORDER — ACETAMINOPHEN 650 MG/1
650 SUPPOSITORY RECTAL EVERY 6 HOURS PRN
Status: DISCONTINUED | OUTPATIENT
Start: 2025-06-18 | End: 2025-06-21 | Stop reason: HOSPADM

## 2025-06-18 RX ORDER — INSULIN LISPRO 100 [IU]/ML
0-8 INJECTION, SOLUTION INTRAVENOUS; SUBCUTANEOUS
Status: DISCONTINUED | OUTPATIENT
Start: 2025-06-18 | End: 2025-06-21 | Stop reason: HOSPADM

## 2025-06-18 RX ORDER — SODIUM CHLORIDE 0.9 % (FLUSH) 0.9 %
5-40 SYRINGE (ML) INJECTION PRN
Status: DISCONTINUED | OUTPATIENT
Start: 2025-06-18 | End: 2025-06-21 | Stop reason: HOSPADM

## 2025-06-18 RX ORDER — 0.9 % SODIUM CHLORIDE 0.9 %
1000 INTRAVENOUS SOLUTION INTRAVENOUS
Status: COMPLETED | OUTPATIENT
Start: 2025-06-18 | End: 2025-06-18

## 2025-06-18 RX ORDER — HYDROCHLOROTHIAZIDE 25 MG/1
12.5 TABLET ORAL DAILY
Status: DISCONTINUED | OUTPATIENT
Start: 2025-06-18 | End: 2025-06-21 | Stop reason: HOSPADM

## 2025-06-18 RX ORDER — IOPAMIDOL 755 MG/ML
100 INJECTION, SOLUTION INTRAVASCULAR
Status: COMPLETED | OUTPATIENT
Start: 2025-06-18 | End: 2025-06-18

## 2025-06-18 RX ORDER — ONDANSETRON 4 MG/1
4 TABLET, ORALLY DISINTEGRATING ORAL EVERY 8 HOURS PRN
Status: DISCONTINUED | OUTPATIENT
Start: 2025-06-18 | End: 2025-06-21 | Stop reason: HOSPADM

## 2025-06-18 RX ORDER — POTASSIUM CHLORIDE 7.45 MG/ML
10 INJECTION INTRAVENOUS PRN
Status: DISCONTINUED | OUTPATIENT
Start: 2025-06-18 | End: 2025-06-21 | Stop reason: HOSPADM

## 2025-06-18 RX ORDER — POLYETHYLENE GLYCOL 3350 17 G/17G
17 POWDER, FOR SOLUTION ORAL DAILY PRN
Status: DISCONTINUED | OUTPATIENT
Start: 2025-06-18 | End: 2025-06-21 | Stop reason: HOSPADM

## 2025-06-18 RX ORDER — ONDANSETRON 2 MG/ML
4 INJECTION INTRAMUSCULAR; INTRAVENOUS EVERY 6 HOURS PRN
Status: DISCONTINUED | OUTPATIENT
Start: 2025-06-18 | End: 2025-06-21 | Stop reason: HOSPADM

## 2025-06-18 RX ORDER — ACETAMINOPHEN 325 MG/1
650 TABLET ORAL
Status: COMPLETED | OUTPATIENT
Start: 2025-06-18 | End: 2025-06-18

## 2025-06-18 RX ORDER — MAGNESIUM SULFATE 1 G/100ML
1000 INJECTION INTRAVENOUS
Status: COMPLETED | OUTPATIENT
Start: 2025-06-18 | End: 2025-06-18

## 2025-06-18 RX ORDER — METHENAMINE HIPPURATE 1000 MG/1
1 TABLET ORAL 2 TIMES DAILY WITH MEALS
Status: DISCONTINUED | OUTPATIENT
Start: 2025-06-18 | End: 2025-06-21 | Stop reason: HOSPADM

## 2025-06-18 RX ORDER — SODIUM CHLORIDE 9 MG/ML
INJECTION, SOLUTION INTRAVENOUS PRN
Status: DISCONTINUED | OUTPATIENT
Start: 2025-06-18 | End: 2025-06-21 | Stop reason: HOSPADM

## 2025-06-18 RX ORDER — SODIUM CHLORIDE, SODIUM LACTATE, POTASSIUM CHLORIDE, CALCIUM CHLORIDE 600; 310; 30; 20 MG/100ML; MG/100ML; MG/100ML; MG/100ML
INJECTION, SOLUTION INTRAVENOUS CONTINUOUS
Status: DISPENSED | OUTPATIENT
Start: 2025-06-18 | End: 2025-06-19

## 2025-06-18 RX ORDER — POTASSIUM CHLORIDE 750 MG/1
40 TABLET, EXTENDED RELEASE ORAL ONCE
Status: COMPLETED | OUTPATIENT
Start: 2025-06-18 | End: 2025-06-18

## 2025-06-18 RX ORDER — ATORVASTATIN CALCIUM 40 MG/1
40 TABLET, FILM COATED ORAL DAILY
Status: DISCONTINUED | OUTPATIENT
Start: 2025-06-18 | End: 2025-06-21 | Stop reason: HOSPADM

## 2025-06-18 RX ORDER — LOSARTAN POTASSIUM 50 MG/1
100 TABLET ORAL DAILY
Status: DISCONTINUED | OUTPATIENT
Start: 2025-06-18 | End: 2025-06-21 | Stop reason: HOSPADM

## 2025-06-18 RX ORDER — AMLODIPINE BESYLATE 5 MG/1
5 TABLET ORAL DAILY
Status: DISCONTINUED | OUTPATIENT
Start: 2025-06-18 | End: 2025-06-21 | Stop reason: HOSPADM

## 2025-06-18 RX ORDER — ASPIRIN 81 MG/1
81 TABLET ORAL DAILY
Status: DISCONTINUED | OUTPATIENT
Start: 2025-06-18 | End: 2025-06-21 | Stop reason: HOSPADM

## 2025-06-18 RX ORDER — GLUCAGON 1 MG/ML
1 KIT INJECTION PRN
Status: DISCONTINUED | OUTPATIENT
Start: 2025-06-18 | End: 2025-06-21 | Stop reason: HOSPADM

## 2025-06-18 RX ORDER — SODIUM CHLORIDE 0.9 % (FLUSH) 0.9 %
5-40 SYRINGE (ML) INJECTION EVERY 12 HOURS SCHEDULED
Status: DISCONTINUED | OUTPATIENT
Start: 2025-06-18 | End: 2025-06-21 | Stop reason: HOSPADM

## 2025-06-18 RX ORDER — TAMSULOSIN HYDROCHLORIDE 0.4 MG/1
0.4 CAPSULE ORAL DAILY
Status: DISCONTINUED | OUTPATIENT
Start: 2025-06-18 | End: 2025-06-21 | Stop reason: HOSPADM

## 2025-06-18 RX ORDER — CLOPIDOGREL BISULFATE 75 MG/1
75 TABLET ORAL DAILY
Status: DISCONTINUED | OUTPATIENT
Start: 2025-06-18 | End: 2025-06-21 | Stop reason: HOSPADM

## 2025-06-18 RX ORDER — ACETAMINOPHEN 325 MG/1
650 TABLET ORAL EVERY 6 HOURS PRN
Status: DISCONTINUED | OUTPATIENT
Start: 2025-06-18 | End: 2025-06-21 | Stop reason: HOSPADM

## 2025-06-18 RX ADMIN — TAMSULOSIN HYDROCHLORIDE 0.4 MG: 0.4 CAPSULE ORAL at 17:30

## 2025-06-18 RX ADMIN — POTASSIUM CHLORIDE 10 MEQ: 7.45 INJECTION INTRAVENOUS at 03:58

## 2025-06-18 RX ADMIN — PIPERACILLIN AND TAZOBACTAM 3375 MG: 3; .375 INJECTION, POWDER, LYOPHILIZED, FOR SOLUTION INTRAVENOUS at 21:30

## 2025-06-18 RX ADMIN — VANCOMYCIN HYDROCHLORIDE 1000 MG: 1 INJECTION, POWDER, LYOPHILIZED, FOR SOLUTION INTRAVENOUS at 04:14

## 2025-06-18 RX ADMIN — IOPAMIDOL 100 ML: 755 INJECTION, SOLUTION INTRAVENOUS at 05:11

## 2025-06-18 RX ADMIN — LOSARTAN POTASSIUM 100 MG: 50 TABLET, FILM COATED ORAL at 17:30

## 2025-06-18 RX ADMIN — ATORVASTATIN CALCIUM 40 MG: 40 TABLET, FILM COATED ORAL at 17:30

## 2025-06-18 RX ADMIN — PIPERACILLIN AND TAZOBACTAM 4500 MG: 4; .5 INJECTION, POWDER, LYOPHILIZED, FOR SOLUTION INTRAVENOUS at 02:01

## 2025-06-18 RX ADMIN — METHENAMINE HIPPURATE 1 G: 1 TABLET ORAL at 17:30

## 2025-06-18 RX ADMIN — POTASSIUM CHLORIDE 40 MEQ: 750 TABLET, FILM COATED, EXTENDED RELEASE ORAL at 02:43

## 2025-06-18 RX ADMIN — POTASSIUM CHLORIDE 10 MEQ: 7.45 INJECTION INTRAVENOUS at 05:32

## 2025-06-18 RX ADMIN — METFORMIN HYDROCHLORIDE 500 MG: 500 TABLET ORAL at 17:30

## 2025-06-18 RX ADMIN — AMLODIPINE BESYLATE 5 MG: 5 TABLET ORAL at 17:30

## 2025-06-18 RX ADMIN — IOPAMIDOL 100 ML: 755 INJECTION, SOLUTION INTRAVENOUS at 00:50

## 2025-06-18 RX ADMIN — SODIUM CHLORIDE, SODIUM LACTATE, POTASSIUM CHLORIDE, AND CALCIUM CHLORIDE: .6; .31; .03; .02 INJECTION, SOLUTION INTRAVENOUS at 12:17

## 2025-06-18 RX ADMIN — VANCOMYCIN HYDROCHLORIDE 750 MG: 750 INJECTION, POWDER, LYOPHILIZED, FOR SOLUTION INTRAVENOUS at 17:36

## 2025-06-18 RX ADMIN — POTASSIUM CHLORIDE 10 MEQ: 7.45 INJECTION INTRAVENOUS at 06:30

## 2025-06-18 RX ADMIN — MAGNESIUM SULFATE HEPTAHYDRATE 1000 MG: 1 INJECTION, SOLUTION INTRAVENOUS at 02:50

## 2025-06-18 RX ADMIN — PIPERACILLIN AND TAZOBACTAM 3375 MG: 3; .375 INJECTION, POWDER, LYOPHILIZED, FOR SOLUTION INTRAVENOUS at 12:22

## 2025-06-18 RX ADMIN — SODIUM CHLORIDE, PRESERVATIVE FREE 10 ML: 5 INJECTION INTRAVENOUS at 17:31

## 2025-06-18 RX ADMIN — SODIUM CHLORIDE 1000 ML: 0.9 INJECTION, SOLUTION INTRAVENOUS at 01:56

## 2025-06-18 RX ADMIN — ACETAMINOPHEN 650 MG: 325 TABLET ORAL at 02:43

## 2025-06-18 RX ADMIN — VANCOMYCIN HYDROCHLORIDE 1000 MG: 1 INJECTION, POWDER, LYOPHILIZED, FOR SOLUTION INTRAVENOUS at 02:43

## 2025-06-18 RX ADMIN — SODIUM CHLORIDE, PRESERVATIVE FREE 10 ML: 5 INJECTION INTRAVENOUS at 21:39

## 2025-06-18 ASSESSMENT — ENCOUNTER SYMPTOMS
DIARRHEA: 0
ABDOMINAL PAIN: 0
COUGH: 0
VOMITING: 0
SHORTNESS OF BREATH: 0
NAUSEA: 0
BACK PAIN: 0

## 2025-06-18 ASSESSMENT — PAIN SCALES - GENERAL
PAINLEVEL_OUTOF10: 0

## 2025-06-18 ASSESSMENT — PAIN - FUNCTIONAL ASSESSMENT
PAIN_FUNCTIONAL_ASSESSMENT: 0-10
PAIN_FUNCTIONAL_ASSESSMENT: NONE - DENIES PAIN

## 2025-06-18 ASSESSMENT — LIFESTYLE VARIABLES
HOW OFTEN DO YOU HAVE A DRINK CONTAINING ALCOHOL: NEVER
HOW MANY STANDARD DRINKS CONTAINING ALCOHOL DO YOU HAVE ON A TYPICAL DAY: PATIENT DOES NOT DRINK

## 2025-06-18 NOTE — ED NOTES
..TRANSFER - OUT REPORT:    Verbal report given to Ines SANTOS on Stewart Ramos  being transferred to [place] ED for routine progression of patient care       Report consisted of patient's Situation, Background, Assessment and   Recommendations(SBAR).     Information from the following report(s) ED SBAR was reviewed with the receiving nurse.    [place] Fall Assessment:    Presents to emergency department  because of falls (Syncope, seizure, or loss of consciousness): Yes  Age > 70: No  Altered Mental Status, Intoxication with alcohol or substance confusion (Disorientation, impaired judgment, poor safety awaremess, or inability to follow instructions): Yes  Impaired Mobility: Ambulates or transfers with assistive devices or assistance; Unable to ambulate or transer.: Yes  Nursing Judgement: Yes          Lines:   Peripheral IV 06/18/25 Distal;Left;Anterior Cephalic (Active)       Peripheral IV 06/18/25 Right Antecubital (Active)        Opportunity for questions and clarification was provided.      Patient transported with:  Monitor

## 2025-06-18 NOTE — CARE COORDINATION
06/18/25 1047   Service Assessment   Patient Orientation Alert and Oriented   Cognition Alert   History Provided By Patient   Primary Caregiver Self   Accompanied By/Relationship Pt alone.   Support Systems Children;Family Members   Patient's Healthcare Decision Maker is: Legal Next of Kin   PCP Verified by CM Yes  (Roel Benítez - seen 1 month ago.)   Last Visit to PCP Within last 3 months   Prior Functional Level Independent in ADLs/IADLs;Mobility  (Walker)   Current Functional Level Independent in ADLs/IADLs;Mobility  (Walker)   Can patient return to prior living arrangement Yes   Ability to make needs known: Fair   Family able to assist with home care needs: Yes   Would you like for me to discuss the discharge plan with any other family members/significant others, and if so, who? Yes  (Daughter Deirdre Ramos)   Financial Resources Medicare   Community Resources None   CM/SW Referral Other (see comment)  (None)   Social/Functional History   Lives With Daughter   Type of Home House   Home Layout One level   Home Access Stairs to enter without rails   Bathroom Shower/Tub Tub/Shower unit   Bathroom Toilet Standard   Bathroom Equipment None   Bathroom Accessibility Accessible   Home Equipment Walker - Standard   Receives Help From Family   Prior Level of Assist for ADLs Independent   Prior Level of Assist for Homemaking Independent   Homemaking Responsibilities Yes   Ambulation Assistance Needs assistance  (Walker)   Prior Level of Assist for Transfers Independent   Active  No   Occupation Retired   Discharge Planning   Type of Residence House   Living Arrangements Children  (Lives with daughter.)   Current Services Prior To Admission None   Potential Assistance Needed N/A   DME Ordered? No   Potential Assistance Purchasing Medications No   Type of Home Care Services None   Patient expects to be discharged to: House   One/Two Story Residence One story   History of falls? 1  (Fell last HS.)   Services

## 2025-06-18 NOTE — ED PROVIDER NOTES
[facility] EMERGENCY DEPT  EMERGENCY DEPARTMENT HISTORY AND PHYSICAL EXAM      Date of evaluation: 6/17/2025  Patient Name: Stewart Ramos  Birthdate 1955  MRN: [id number]  ED Provider: Colette Viramontes MD   Note Started: 1:00 AM EDT 6/18/25    HISTORY OF PRESENT ILLNESS     Chief Complaint   Patient presents with    Fatigue       History Provided By: Patient, daughter/son     HPI: Stewart Ramos is a 70 y.o. male with a past med history CVA with residual right-sided weakness, diabetes, hypertension, sleep apnea, urinary retention, PAD status post left leg angiogram on 6/10/25 presents for altered mental status, fatigue and slow slurred speech family last saw him well at 11 PM on Monday night.  On Tuesday morning at 6 AM patient's son-in-law found him on the floor unknown at what time he fell.  Patient was then helped up onto the bed.  When daughter arrived at 6 PM from work, patient was still in bed, did not take any his medications and had not eaten during the whole day.  Daughter reports he is moving slower than usual, appears to be confused and his speech is different..  After the angiogram on 6/10, patient was noted to have hematuria and a Canales was placed.  Canales was removed on Monday, 6/16 by Dr. Khan, urology.  Daughter reports that patient has been urinating on himself.  He was diagnosed with urinary tract infection and started taking Doxy, first dose was today.      PAST MEDICAL HISTORY   Past Medical History:  Past Medical History:   Diagnosis Date    CVA (cerebral vascular accident) (HCC)     early 2000's, right sided weakness    Diabetes mellitus (HCC)     Hepatitis C     Hypertension     Sleep apnea     no CPAP    Urinary retention 6/14/2025       Past Surgical History:  Past Surgical History:   Procedure Laterality Date    CAROTID ARTERY SURGERY Left     INVASIVE VASCULAR N/A 6/10/2025    Angiography lower ext left performed by Joel Robertson MD at [facility] ELECTROPHYSIOLOGY    INVASIVE VASCULAR N/A  as per orders.   Fluid resuscitation volume with the FULL 30ml/kg crystalloid bolus was: CONTRAINDICATED: The patient received LESS than the Full 30ml/kg bolus due to concern for harm as the patient has Concern for fluid overload, so 1000 ml fluids given instead  I have performed a sepsis reassessment of the patient's clinical volume status and tissue perfusion at TIME: 2:30 AM, and DATE: 06/18/25  , and the patient is adequately resuscitated.    Critical Care Time: There was a high probability of life-threatening clinical deterioration in the patient's condition requiring my urgent intervention.  I personally saw the patient and independently provided 45 minutes of non-concurrent critical care out of the total shared critical care time provided, excluding separately reportable procedures.   CLINICAL IMPRESSIONS     1. Sepsis, due to unspecified organism, unspecified whether acute organ dysfunction present (HCC)    2. Acute cystitis with hematuria    3. Hypokalemia    4. Hypomagnesemia    5. Stroke-like symptoms    6. Enlarged prostate    .   SDOH/DISPOSITION/PLAN   Social Determinants affecting Treatment Plan: None    DISPOSITION               Admit Note: Pt is being admitted by Hospitalist . The results of their tests and reason(s) for their admission have been discussed with pt and/or available family. They convey agreement and understanding for the need to be admitted and for the admission diagnosis.     PATIENT REFERRED TO:  No follow-up provider specified.      DISCHARGE MEDICATIONS:     Medication List        ASK your doctor about these medications      amLODIPine 5 MG tablet  Commonly known as: NORVASC     aspirin 81 MG EC tablet     atorvastatin 40 MG tablet  Commonly known as: LIPITOR     clopidogrel 75 MG tablet  Commonly known as: PLAVIX  Take 1 tablet by mouth daily     doxycycline hyclate 100 MG tablet  Commonly known as: VIBRA-TABS  Take 1 tablet by mouth 2 times daily for 10 days     glipiZIDE 10 MG

## 2025-06-18 NOTE — ED NOTES
Unsuccessful attempt at in and out cath. Pt tolerated well. Resistance met with insertion. Blood on end of cath upon discontinuation. Pt denies pain.

## 2025-06-18 NOTE — ED TRIAGE NOTES
6/10 Cardiac stent surgery. Urinary cath d/c yesterday. Fell last night, found by family around 6:30am. Unsure if he hit his head. On blood thinner. Per family more lethargic today, urinary incontinence today, speech slower than usual.   LKW 1322 6/16/25  A&O to person and place. Pt not oriented to situation and time  Hx stroke 3 years ago. Right sided weakness and delayed speech at baseline.

## 2025-06-18 NOTE — PROGRESS NOTES
4 Eyes Skin Assessment     NAME:  Stewart Ramos  YOB: 1955  MEDICAL RECORD NUMBER:  [id number]    The patient is being assessed for  Admission    I agree that at least one RN has performed a thorough Head to Toe Skin Assessment on the patient. ALL assessment sites listed below have been assessed.      Areas assessed by both nurses:    Head, Face, Ears, Shoulders, Back, Chest, Arms, Elbows, Hands, Sacrum. Buttock, Coccyx, Ischium, and Legs. Feet and Heels        Does the Patient have a Wound? No noted wound(s)       Keshawn Prevention initiated by RN: Yes  Wound Care Orders initiated by RN: No    Pressure Injury (Stage 3,4, Unstageable, DTI, NWPT, and Complex wounds) if present, place Wound referral order by RN under : No    New Ostomies, if present place, Ostomy referral order under : No     Nurse 1 eSignature: Electronically signed by Ileana Fernández RN on 6/18/25 at 6:22 PM EDT    **SHARE this note so that the co-signing nurse can place an eSignature**    Nurse 2 eSignature: Electronically signed by Shannan Stern LPN on 6/18/25 at 7:59 PM EDT

## 2025-06-18 NOTE — PROGRESS NOTES
Vancomycin Dosing Consult  Stewart Ramos is a 70 y.o. male with UTI. Pharmacy was consulted by Sharla Mcmillan PA-C to dose and monitor vancomycin. Today is day 1.    Antibiotic regimen: Vancomycin + Zosyn    Temp (24hrs), Av.2 °F (37.3 °C), Min:98.2 °F (36.8 °C), Max:101.2 °F (38.4 °C)    Recent Labs     25  0015   WBC 15.2*   CREATININE 1.21   BUN 19     Est CrCl: 61 mL/min  Concomitant nephrotoxic drugs: Contrast agents, losartan    Cultures:    Blood x 2: in process    MRSA Swab: Pending    Target range: AUC/JOJO 400-600    Recent level history:  Date/Time Dose & Interval Measured Level (mcg/mL) Associated AUC/JOJO              Assessment/Plan:   LD of vancomycin 2000 mg IV x 1 given  Renal function is stable, continue with AUC guided dosing.  Start maintenance dose of vancomycin 750 mg IV q12h for a predicted AUCss of 480.   BMP ordered daily through   Level scheduled for  at 1300  Antimicrobial stop date 7 days

## 2025-06-18 NOTE — PLAN OF CARE
Problem: Chronic Conditions and Co-morbidities  Goal: Patient's chronic conditions and co-morbidity symptoms are monitored and maintained or improved  Outcome: Progressing  Flowsheets (Taken 6/18/2025 1806 by Ileana Fernández, RN)  Care Plan - Patient's Chronic Conditions and Co-Morbidity Symptoms are Monitored and Maintained or Improved: Monitor and assess patient's chronic conditions and comorbid symptoms for stability, deterioration, or improvement     Problem: Discharge Planning  Goal: Discharge to home or other facility with appropriate resources  Outcome: Progressing  Flowsheets (Taken 6/18/2025 1806 by Ileana Fernández, RN)  Discharge to home or other facility with appropriate resources: Identify barriers to discharge with patient and caregiver     Problem: Pain  Goal: Verbalizes/displays adequate comfort level or baseline comfort level  Outcome: Progressing     Problem: Safety - Adult  Goal: Free from fall injury  Outcome: Progressing     Problem: Skin/Tissue Integrity  Goal: Skin integrity remains intact  Description: 1.  Monitor for areas of redness and/or skin breakdown  2.  Assess vascular access sites hourly  3.  Every 4-6 hours minimum:  Change oxygen saturation probe site  4.  Every 4-6 hours:  If on nasal continuous positive airway pressure, respiratory therapy assess nares and determine need for appliance change or resting period  Outcome: Progressing

## 2025-06-18 NOTE — PROGRESS NOTES
Received Order for Telemetry     Stewart EUBANKS Richard   1955   [id number]   Sepsis (HCC) [A41.9]   Paulina Martinez MD     Tele Box # [id number] placed on patient at 0946 am  ER Room # 10  Admitting to Room 420  Transferring Nurse BRANDON  Verified with Primary Nurse BRANDON at 0946 am

## 2025-06-18 NOTE — H&P
Hospitalist Admission Note    NAME: Stewart Ramos   :  1955   MRN:  [id number]     Date/Time:  2025 12:18 PM    Patient PCP: Roel Benítez MD    ______________________________________________________________________  Given the patient's current clinical presentation, I have a high level of concern for decompensation if discharged from the emergency department.  Complex decision making was performed, which includes reviewing the patient's available past medical records, laboratory results, and x-ray films.       My assessment of this patient's clinical condition and my plan of care is as follows.    Assessment / Plan:  Urinary tract infection  Metabolic encephalopathy 2/2 above  Urinary retention with history of urethral stricture status post dilation with urology 6/10  Leukocytosis 15,200  Lactic acid within normal limits  Procal 0.27  UA grossly infected with pyuria, hematuria, 2+ bacteria and nitrate positive  Blood cultures obtained in ED  Continue IV Zosyn  No previous urine cultures in EMR, urine culture not sent, will order  CT abdomen pelvis with 3.2 cm abdominal aortic aneurysm, palpable cyst left kidney, decompressed urinary bladder, moderately enlarged prostate  Consult Urology regarding retention and hematuria, recently seen in office on  and started on doxycycline for UTI.   Continue Flomax    Ground-level fall  CT of the head with no acute process  CTA head and neck with left carotid stenting, patent, 60% stenosis right ICA, no large vessel occlusion  Suspect weakness 2/2 UTI  PT/OT    PAD status post angiogram 6/10  ICA stenosis status post stent  Hold aspirin and Plavix in the setting of current hematuria  Continue atorvastatin    History of CVA with right sided residual deficit and dysarthria  CT head negative  Continue statin, hold ASA as above    DM2  Continue NPH 20 units twice daily  Continue metformin  SSI and Accu-Cheks    Hypokalemia  Potassium 2.7  Mag  After discussion I am in agreement that acuity of patient's medical condition necessitates hospital stay.      Code Status: Full  DVT Prophylaxis: SCD  GI Prophylaxis: None      Subjective:   CHIEF COMPLAINT: Ground-level fall    HISTORY OF PRESENT ILLNESS:     Stewart Ramos is a 70 y.o.  male with PMHx significant for CVA with right-sided residual deficit, DM2, HTN, urinary retention w/ hx of urethral stricture, BPH who presented to free standing ED after being found on the ground by his family. Patient is oriented to person and place, poor historian, not oriented to the month. History primarily obtained from patient and previous records. Patient reports he had gotten up out of bed last night, slipped and fell backwards. He denies head injury. No LOC. He was too weak to get up on his own. Family helped him return to bed but when they checked on him later in the day he was still in bed, had not taken his medications, or eaten all day. Family was concerned as he seemed slower than normal and was not acting himself. Patient was recently seen in Urology office and was diagnosed with UTI, started on doxycycline. In the ED patient febrile 101.2, tachycardic, not hypotensive. CT of the head with no acute abnormality.  CTA head and neck with no large vessel occlusion, status post left carotid stenting with patent stent, approximately 60% stenosis right ICA.  CT abdomen pelvis with 3.2 cm abdominal aortic aneurysm, otherwise no acute intra-abdominal pathology.  Labs significant for leukocytosis of 15,000, Pro-Chris 0.27.  CMP with hypokalemia with potassium 2.7, hypomagnesemia mag 1.3.  No CYRUS.  Lactic acid within normal limits.  Troponin negative x 2Patient with urinary retention requiring del valle cath placement. UA concerning for UTI with hematuria, 2+ bacteria, nitrate positive.  Blood cultures obtained.  Patient given dose of IV Zosyn in the ED.  We were asked to admit for further workup and evaluation of sepsis suspected

## 2025-06-18 NOTE — ED NOTES
ED TO INPATIENT SBAR HANDOFF    Patient Name: Stewart Ramos   Preferred Name: Stewart  : 1955  70 y.o.   Family/Caregiver Present: no   Code Status Order: Prior  PO Status: NPO:no  Telemetry Order: Yes  C-SSRS: Risk of Suicide: No Risk  Sitter no   Restraints:     Sepsis Risk Score Sepsis V2 Risk Score: 10.9    Situation  Chief Complaint   Patient presents with    Fatigue     Brief Description of Patient's Condition: Stewart Ramos is a 70 y.o. male with a past med history CVA with residual right-sided weakness, diabetes, hypertension, sleep apnea, urinary retention, PAD status post left leg angiogram on 6/10/25 presents for altered mental status, fatigue and slow slurred speech family last saw him well at 11 PM on Monday night.  On Tuesday morning at 6 AM patient's son-in-law found him on the floor unknown at what time he fell.  Patient was then helped up onto the bed.  When daughter arrived at 6 PM from work, patient was still in bed, did not take any his medications and had not eaten during the whole day.  Daughter reports he is moving slower than usual, appears to be confused and his speech is different..  After the angiogram on 6/10, patient was noted to have hematuria and a Canales was placed.  Canales was removed on Monday,  by Dr. Khan, urology.  Daughter reports that patient has been urinating on himself.  He was diagnosed with urinary tract infection and started taking Doxy, first dose was today.     Mental Status: oriented and alert  Arrived from:Home  Imaging:   CTA ABDOMEN PELVIS W CONTRAST   Final Result      1. No evidence of metastatic disease      2. 3.2 cm abdominal aortic aneurysm.      3. Other incidental findings as above      Electronically signed by Quentin Jackson      XR CHEST PORTABLE   Final Result   No acute cardiopulmonary process.      Electronically signed by Quentin Jackson      CTA HEAD NECK W CONTRAST   Final Result      1. Status post left carotid stenting. The stent is

## 2025-06-19 LAB
ANION GAP SERPL CALC-SCNC: 8 MMOL/L (ref 2–12)
BASOPHILS # BLD: 0.02 K/UL (ref 0–0.1)
BASOPHILS NFR BLD: 0.2 % (ref 0–1)
BUN SERPL-MCNC: 11 MG/DL (ref 6–20)
BUN/CREAT SERPL: 12 (ref 12–20)
CA-I BLD-MCNC: 8.5 MG/DL (ref 8.5–10.1)
CHLORIDE SERPL-SCNC: 109 MMOL/L (ref 97–108)
CO2 SERPL-SCNC: 25 MMOL/L (ref 21–32)
CREAT SERPL-MCNC: 0.91 MG/DL (ref 0.7–1.3)
DIFFERENTIAL METHOD BLD: ABNORMAL
EOSINOPHIL # BLD: 0.16 K/UL (ref 0–0.4)
EOSINOPHIL NFR BLD: 1.6 % (ref 0–7)
ERYTHROCYTE [DISTWIDTH] IN BLOOD BY AUTOMATED COUNT: 13.2 % (ref 11.5–14.5)
GLUCOSE BLD STRIP.AUTO-MCNC: 112 MG/DL (ref 65–100)
GLUCOSE BLD STRIP.AUTO-MCNC: 180 MG/DL (ref 65–100)
GLUCOSE BLD STRIP.AUTO-MCNC: 191 MG/DL (ref 65–100)
GLUCOSE BLD STRIP.AUTO-MCNC: 243 MG/DL (ref 65–100)
GLUCOSE SERPL-MCNC: 105 MG/DL (ref 65–100)
HCT VFR BLD AUTO: 33.6 % (ref 36.6–50.3)
HGB BLD-MCNC: 11.1 G/DL (ref 12.1–17)
IMM GRANULOCYTES # BLD AUTO: 0.05 K/UL (ref 0–0.04)
IMM GRANULOCYTES NFR BLD AUTO: 0.5 % (ref 0–0.5)
LYMPHOCYTES # BLD: 1.57 K/UL (ref 0.8–3.5)
LYMPHOCYTES NFR BLD: 15.4 % (ref 12–49)
MAGNESIUM SERPL-MCNC: 1.7 MG/DL (ref 1.6–2.4)
MCH RBC QN AUTO: 28.6 PG (ref 26–34)
MCHC RBC AUTO-ENTMCNC: 33 G/DL (ref 30–36.5)
MCV RBC AUTO: 86.6 FL (ref 80–99)
MONOCYTES # BLD: 0.53 K/UL (ref 0–1)
MONOCYTES NFR BLD: 5.2 % (ref 5–13)
MRSA DNA SPEC QL NAA+PROBE: NOT DETECTED
NEUTS SEG # BLD: 7.85 K/UL (ref 1.8–8)
NEUTS SEG NFR BLD: 77.1 % (ref 32–75)
NRBC # BLD: 0 K/UL (ref 0–0.01)
NRBC BLD-RTO: 0 PER 100 WBC
PERFORMED BY:: ABNORMAL
PLATELET # BLD AUTO: 210 K/UL (ref 150–400)
PMV BLD AUTO: 11.6 FL (ref 8.9–12.9)
POTASSIUM SERPL-SCNC: 3.1 MMOL/L (ref 3.5–5.1)
PROCALCITONIN SERPL-MCNC: 0.25 NG/ML
RBC # BLD AUTO: 3.88 M/UL (ref 4.1–5.7)
SODIUM SERPL-SCNC: 142 MMOL/L (ref 136–145)
WBC # BLD AUTO: 10.2 K/UL (ref 4.1–11.1)

## 2025-06-19 PROCEDURE — 85025 COMPLETE CBC W/AUTO DIFF WBC: CPT

## 2025-06-19 PROCEDURE — 82962 GLUCOSE BLOOD TEST: CPT

## 2025-06-19 PROCEDURE — 2500000003 HC RX 250 WO HCPCS: Performed by: STUDENT IN AN ORGANIZED HEALTH CARE EDUCATION/TRAINING PROGRAM

## 2025-06-19 PROCEDURE — 6370000000 HC RX 637 (ALT 250 FOR IP): Performed by: STUDENT IN AN ORGANIZED HEALTH CARE EDUCATION/TRAINING PROGRAM

## 2025-06-19 PROCEDURE — 97530 THERAPEUTIC ACTIVITIES: CPT

## 2025-06-19 PROCEDURE — 87086 URINE CULTURE/COLONY COUNT: CPT

## 2025-06-19 PROCEDURE — 99222 1ST HOSP IP/OBS MODERATE 55: CPT | Performed by: UROLOGY

## 2025-06-19 PROCEDURE — 97165 OT EVAL LOW COMPLEX 30 MIN: CPT

## 2025-06-19 PROCEDURE — 2580000003 HC RX 258: Performed by: STUDENT IN AN ORGANIZED HEALTH CARE EDUCATION/TRAINING PROGRAM

## 2025-06-19 PROCEDURE — 83735 ASSAY OF MAGNESIUM: CPT

## 2025-06-19 PROCEDURE — 80048 BASIC METABOLIC PNL TOTAL CA: CPT

## 2025-06-19 PROCEDURE — 1100000000 HC RM PRIVATE

## 2025-06-19 PROCEDURE — 84145 PROCALCITONIN (PCT): CPT

## 2025-06-19 PROCEDURE — 97161 PT EVAL LOW COMPLEX 20 MIN: CPT

## 2025-06-19 PROCEDURE — 6360000002 HC RX W HCPCS: Performed by: STUDENT IN AN ORGANIZED HEALTH CARE EDUCATION/TRAINING PROGRAM

## 2025-06-19 PROCEDURE — 6360000002 HC RX W HCPCS: Performed by: INTERNAL MEDICINE

## 2025-06-19 PROCEDURE — 36415 COLL VENOUS BLD VENIPUNCTURE: CPT

## 2025-06-19 PROCEDURE — 2580000003 HC RX 258: Performed by: INTERNAL MEDICINE

## 2025-06-19 RX ORDER — POTASSIUM CHLORIDE 1500 MG/1
40 TABLET, EXTENDED RELEASE ORAL ONCE
Status: COMPLETED | OUTPATIENT
Start: 2025-06-19 | End: 2025-06-19

## 2025-06-19 RX ADMIN — METFORMIN HYDROCHLORIDE 500 MG: 500 TABLET ORAL at 17:58

## 2025-06-19 RX ADMIN — SODIUM CHLORIDE, PRESERVATIVE FREE 10 ML: 5 INJECTION INTRAVENOUS at 09:58

## 2025-06-19 RX ADMIN — VANCOMYCIN HYDROCHLORIDE 750 MG: 750 INJECTION, POWDER, LYOPHILIZED, FOR SOLUTION INTRAVENOUS at 03:20

## 2025-06-19 RX ADMIN — METHENAMINE HIPPURATE 1 G: 1 TABLET ORAL at 09:58

## 2025-06-19 RX ADMIN — INSULIN LISPRO 2 UNITS: 100 INJECTION, SOLUTION INTRAVENOUS; SUBCUTANEOUS at 13:01

## 2025-06-19 RX ADMIN — INSULIN LISPRO 2 UNITS: 100 INJECTION, SOLUTION INTRAVENOUS; SUBCUTANEOUS at 20:21

## 2025-06-19 RX ADMIN — METHENAMINE HIPPURATE 1 G: 1 TABLET ORAL at 17:58

## 2025-06-19 RX ADMIN — VANCOMYCIN HYDROCHLORIDE 1000 MG: 1 INJECTION, POWDER, LYOPHILIZED, FOR SOLUTION INTRAVENOUS at 15:18

## 2025-06-19 RX ADMIN — PIPERACILLIN AND TAZOBACTAM 3375 MG: 3; .375 INJECTION, POWDER, LYOPHILIZED, FOR SOLUTION INTRAVENOUS at 11:53

## 2025-06-19 RX ADMIN — POTASSIUM CHLORIDE 40 MEQ: 1500 TABLET, EXTENDED RELEASE ORAL at 11:54

## 2025-06-19 RX ADMIN — PIPERACILLIN AND TAZOBACTAM 3375 MG: 3; .375 INJECTION, POWDER, LYOPHILIZED, FOR SOLUTION INTRAVENOUS at 20:13

## 2025-06-19 RX ADMIN — METFORMIN HYDROCHLORIDE 500 MG: 500 TABLET ORAL at 09:58

## 2025-06-19 RX ADMIN — LOSARTAN POTASSIUM 100 MG: 50 TABLET, FILM COATED ORAL at 09:58

## 2025-06-19 RX ADMIN — ATORVASTATIN CALCIUM 40 MG: 40 TABLET, FILM COATED ORAL at 09:58

## 2025-06-19 RX ADMIN — INSULIN LISPRO 2 UNITS: 100 INJECTION, SOLUTION INTRAVENOUS; SUBCUTANEOUS at 17:59

## 2025-06-19 RX ADMIN — SODIUM CHLORIDE, PRESERVATIVE FREE 10 ML: 5 INJECTION INTRAVENOUS at 20:24

## 2025-06-19 RX ADMIN — AMLODIPINE BESYLATE 5 MG: 5 TABLET ORAL at 09:58

## 2025-06-19 RX ADMIN — PIPERACILLIN AND TAZOBACTAM 3375 MG: 3; .375 INJECTION, POWDER, LYOPHILIZED, FOR SOLUTION INTRAVENOUS at 04:29

## 2025-06-19 RX ADMIN — TAMSULOSIN HYDROCHLORIDE 0.4 MG: 0.4 CAPSULE ORAL at 09:58

## 2025-06-19 ASSESSMENT — ENCOUNTER SYMPTOMS
SHORTNESS OF BREATH: 0
COUGH: 0
VOMITING: 0
DIARRHEA: 0
BACK PAIN: 0
ABDOMINAL PAIN: 0
NAUSEA: 0

## 2025-06-19 ASSESSMENT — PAIN SCALES - GENERAL
PAINLEVEL_OUTOF10: 0
PAINLEVEL_OUTOF10: 0

## 2025-06-19 NOTE — PLAN OF CARE
Patient Aox3, lying in bed, denies pain, able to verbalize needs.Patient has an indwelling del valle that is draining bloody urine.Patient denies suprapubic pressure.Noted a huge bruise on the right hip extending to right thigh and flank status post angiogram.Patient to continue with antibiotics for UTI.  Problem: Chronic Conditions and Co-morbidities  Goal: Patient's chronic conditions and co-morbidity symptoms are monitored and maintained or improved  6/19/2025 0227 by Claude Hinds RN  Outcome: Progressing  6/19/2025 0226 by Claude Hinds RN  Outcome: Progressing  6/18/2025 1823 by Shannan Stern LPN  Outcome: Progressing  Flowsheets (Taken 6/18/2025 1806 by Ileana Fernández RN)  Care Plan - Patient's Chronic Conditions and Co-Morbidity Symptoms are Monitored and Maintained or Improved: Monitor and assess patient's chronic conditions and comorbid symptoms for stability, deterioration, or improvement     Problem: Discharge Planning  Goal: Discharge to home or other facility with appropriate resources  6/19/2025 0226 by Claude Hinds RN  Outcome: Progressing  6/18/2025 1823 by Shannan Stern LPN  Outcome: Progressing  Flowsheets (Taken 6/18/2025 1806 by Ileana Fernández RN)  Discharge to home or other facility with appropriate resources: Identify barriers to discharge with patient and caregiver     Problem: Pain  Goal: Verbalizes/displays adequate comfort level or baseline comfort level  6/19/2025 0227 by Claude Hinds RN  Outcome: Progressing  6/19/2025 0226 by Claude Hinds RN  Outcome: Progressing  6/18/2025 1823 by Shannan Stern LPN  Outcome: Progressing     Problem: Safety - Adult  Goal: Free from fall injury  6/19/2025 0226 by Claude Hinds RN  Outcome: Progressing  6/18/2025 1823 by Shannan Stern LPN  Outcome: Progressing     Problem: Skin/Tissue Integrity  Goal: Skin integrity remains intact  Description: 1.  Monitor for areas of redness and/or skin breakdown  2.

## 2025-06-19 NOTE — PLAN OF CARE
OCCUPATIONAL THERAPY EVALUATION  Patient: Stewart Ramos (70 y.o. male)  Date: 6/19/2025  Primary Diagnosis: Hypokalemia [E87.6]  Hypomagnesemia [E83.42]  Enlarged prostate [N40.0]  Acute cystitis with hematuria [N30.01]  Stroke-like symptoms [R29.90]  Sepsis (HCC) [A41.9]  Sepsis, due to unspecified organism, unspecified whether acute organ dysfunction present (HCC) [A41.9]       Precautions: Fall Risk, Bed Alarm                Recommendations for nursing mobility: Out of bed to chair for meals, Encourage HEP in prep for ADLs/mobility; see handout for details, Frequent repositioning to prevent skin breakdown, Use of bed/chair alarm for safety, LE elevation for management of edema, AD and gt belt for bed to chair , Amb to bathroom with AD and gait belt, and Assist x1    In place during session:Peripheral IV and Canales Catheter  ASSESSMENT  Pt is a 70 y.o. male presenting to [facility] with c/o GLF and AMS, admitted 6/18/2025 and currently being treated for UTI, metabolic encephalopathy, urinary retention, GLF, PAD s/p angiogram 6/10, ICA stenosis s/p stent, hx CVA with residual R sided deficits, DM 2, L parotid gland hypodense lesion, AAA. Pt received semi-supine in bed upon arrival, AXO x 4, and agreeable to OT evaluation.     Based on current observations, pt presents with decreased  functional mobility, independence in ADLs, high-level IADLs, ROM, strength, body mechanics, activity tolerance, safety awareness, cognition, command following, attention/concentration, coordination, balance, proprioception, posture, fine-motor control (see below for objective details and assist levels).     Overall, pt tolerates session fair with no c/o pain. Bed mobility and OOB functional mobility to/from the bathroom completed with min A and RW in prep for toileting routine. VC provided for hand placement during transfers and pt demo'd understanding. LB dressing completed with min A to pull sock over L heel while seated EOB. Toileting  review of patient history:   Lives With: Daughter  Type of Home: House  Home Layout: One level  Home Access: Stairs to enter without rails        Bathroom Shower/Tub: Tub/Shower unit  Bathroom Toilet: Standard  Bathroom Equipment: Shower chair  Bathroom Accessibility: Accessible  Home Equipment: Walker - Standard     Social/Functional History  Lives With: Daughter  Type of Home: House  Home Layout: One level  Home Access: Stairs to enter without rails  Entrance Stairs - Number of Steps: 4  Bathroom Shower/Tub: Tub/Shower unit  Bathroom Toilet: Standard  Bathroom Equipment: Shower chair  Bathroom Accessibility: Accessible  Home Equipment: Walker - Standard  Receives Help From: Family  Prior Level of Assist for ADLs: Independent  Prior Level of Assist for Homemaking: Independent  Homemaking Responsibilities: Yes  Prior Level of Assist for Ambulation: Independent household ambulator, with or without device, Independent community ambulator, with or without device  Prior Level of Assist for Transfers: Independent  Active : No  Occupation: Retired    Hand Dominance: right     EXAMINATION OF PERFORMANCE DEFICITS:    Cognitive/Behavioral Status:  Orientation  Orientation Level: Oriented X4  Cognition  Overall Cognitive Status: Exceptions  Arousal/Alertness: Delayed responses to stimuli  Following Commands: Follows one step commands with repetition;Follows one step commands with increased time  Attention Span: Attends with cues to redirect  Safety Judgement: Decreased awareness of need for safety;Decreased awareness of need for assistance  Problem Solving: Decreased awareness of errors;Assistance required to correct errors made;Assistance required to identify errors made  Insights: Decreased awareness of deficits  Initiation: Requires cues for some  Sequencing: Requires cues for some    Skin: intact where exposed    Edema: none observed    Hearing:   Hearing  Hearing: Within functional limits    Vision/Perceptual:

## 2025-06-19 NOTE — CONSULTS
UROLOGY CONSULT    Chadwick Agrawal MD  [phone] Office    Patient: Stewart Ramos MRN: [id number]  SSN: [id number]    YOB: 1955  Age: 70 y.o.  Sex: male          Date of Encounter:  June 19, 2025  ADMITTED: 6/18/2025  for Hypokalemia [E87.6]  Hypomagnesemia [E83.42]  Enlarged prostate [N40.0]  Acute cystitis with hematuria [N30.01]  Stroke-like symptoms [R29.90]  Sepsis (HCC) [A41.9]  Sepsis, due to unspecified organism, unspecified whether acute organ dysfunction present (HCC) [A41.9]  Chief Complaint:  fall  Reason for consult: hematuria, urethral stricture, pyuria             History of Present Illness:  Patient is a 70 y.o. male admitted 6/18/2025 to the hospital for Hypokalemia [E87.6]  Hypomagnesemia [E83.42]  Enlarged prostate [N40.0]  Acute cystitis with hematuria [N30.01]  Stroke-like symptoms [R29.90]  Sepsis (HCC) [A41.9]  Sepsis, due to unspecified organism, unspecified whether acute organ dysfunction present (HCC) [A41.9].     He has a h/o CVA with right sided deficit, on ASA, Plavix for PAD s/p angiogram 6/10, s/p GLF, DM, hypokalemia, parotid gland lesion, AAA 3.2cm.     He had urinary retention and urethral stricturing, dilated by Dr. Khan 6/10/25 to 18F.  VT 6/16 revealed residual 100cc and he declined further catheterization then.     \"On Tuesday morning at 6 AM patient's son-in-law found him on the floor unknown at what time he fell. Patient was then helped up onto the bed. When daughter arrived at 6 PM from work, patient was still in bed, did not take any his medications and had not eaten during the whole day. Daughter reports he is moving slower than usual, appears to be confused and his speech is different. \"    Daughter reports that patient has been urinating on himself. He was diagnosed with urinary tract infection and started taking doxycycline starting 6/18/25.     Right groin ecchymosis noted s/p angiogram.  Diaphoresis, fatigue, fever, general weakness

## 2025-06-19 NOTE — CARE COORDINATION
DCP: home self with daughter  GURWINDER: 48 hours    Pt is awaiting clinical improvement.     Pt has no dc needs at this time. CM team following.

## 2025-06-19 NOTE — PROGRESS NOTES
Spiritual Health History and Assessment/Progress Note  [facility]    Loneliness/Social Isolation,  , Life Adjustments, Adjustment to illness,      Name: Stewart Ramos MRN: [id number]    Age: 70 y.o.     Sex: male   Language: English   Sikh: Patient Refused   Sepsis (HCC)     Date: 6/19/2025            Total Time Calculated: 30 min              Spiritual Assessment began in [facility] ONCOLOGY        Referral/Consult From: Nurse   Encounter Overview/Reason: Loneliness/Social Isolation  Service Provided For: Patient    Carla, Belief, Meaning:   Patient identifies as spiritual, is connected with a carla tradition or spiritual practice, and has beliefs or practices that help with coping during difficult times  Family/Friends No family/friends present      Importance and Influence:  Patient has spiritual/personal beliefs that influence decisions regarding their health  Family/Friends No family/friends present    Community:  Patient is connected with a spiritual community and feels well-supported. Support system includes: Children, Friends, and Extended family  Family/Friends No family/friends present    Assessment and Plan of Care:   This  in to visit this Patient at this time. Patient is alone in the room sitting up in bedside chair. States he is feeling much better than when first admitted. Shared life review/legacy including family, work, being of Vood[employer] carla, and medical history. Patient shared emotions and feelings on the death of his wife, parents, and several siblings. Patient states he is thankful for his children, sisters, and to know that God is blessing him. States he also have two dogs that makes brings nettie to his life. Listened empathically providing time and space for reflection and sharing of life's sacred events. Provided expressions of encouragement and comfort. Maintained sustaining ministry of presence. Patient smiled and laughed intermittently and thanked Tong  for visit.  available upon request.  Patient Interventions include: Facilitated expression of thoughts and feelings, Explored spiritual coping/struggle/distress, Engaged in theological reflection, Affirmed coping skills/support systems, and Facilitated life review and/ or legacy  Family/Friends Interventions include: No family/friends present    Patient Plan of Care: Spiritual Care available upon further referral  Family/Friends Plan of Care: No family/friends present    Electronically signed by Chaplain Yusuf Intern on 6/19/2025 at 4:01 PM

## 2025-06-19 NOTE — PROGRESS NOTES
Hospitalist Progress Note    NAME:   Stewart Ramos   : 1955   MRN: [id number]     Date/Time: 2025 10:31 AM  Patient PCP: Roel Benítez MD    Estimated discharge date:48hrs  Barriers: IV abx, cultures, urology filiberto, PT/OT    Hospital course:  Stewart Ramos is a 70 y.o.  male with PMHx significant for CVA with right-sided residual deficit, DM2, HTN, urinary retention w/ hx of urethral stricture, BPH who was admitted for UTI after being found down at home and more altered than normal. Patient was recently seen in Urology office and was diagnosed with UTI, started on doxycycline. In the ED patient febrile 101.2, tachycardic, not hypotensive. UA nitrate positive, 2+ bacteria with hematuria. CT of the head with no acute abnormality.  CTA head and neck with no large vessel occlusion, status post left carotid stenting with patent stent, approximately 60% stenosis right ICA.  CT abdomen pelvis with 3.2 cm abdominal aortic aneurysm, otherwise no acute intra-abdominal pathology. Labs significant for leukocytosis and elevated procal. Patient was started on IV Zosyn and Vancomycin. Patient also with urinary retention in the ED, del valle catheter subsequently placed. Noted to have significant clots and hematuria per nursing staff. Urology subsequently consulted.     Assessment / Plan:  Urinary tract infection  Metabolic encephalopathy 2/2 above, improved   Urinary retention with history of urethral stricture status post dilation with urology 6/10  Leukocytosis 15,200, resolved WBC 10.2  Lactic acid within normal limits  Procal 0.27  UA grossly infected with pyuria, hematuria, 2+ bacteria and nitrate positive  Blood cultures obtained in ED  Continue IV Zosyn and Vancomycin  Deescalate Vanc if MRSA PCR negative  No previous urine cultures in EMR, urine culture not sent from ED   Urine culture pending   CT abdomen pelvis with 3.2 cm abdominal aortic aneurysm, palpable cyst left kidney, decompressed urinary

## 2025-06-19 NOTE — PLAN OF CARE
PHYSICAL THERAPY EVALUATION  Patient: Stewart Ramos (70 y.o. male)  Date: 6/19/2025  Primary Diagnosis: Hypokalemia [E87.6]  Hypomagnesemia [E83.42]  Enlarged prostate [N40.0]  Acute cystitis with hematuria [N30.01]  Stroke-like symptoms [R29.90]  Sepsis (HCC) [A41.9]  Sepsis, due to unspecified organism, unspecified whether acute organ dysfunction present (HCC) [A41.9]       Precautions: Fall Risk, Bed Alarm                      Recommendations for nursing mobility: Out of bed to chair for meals, Encourage HEP in prep for ADLs/mobility; see handout for details, Frequent repositioning to prevent skin breakdown, Use of bed/chair alarm for safety, AD and gt belt for bed to chair , Amb to bathroom with AD and gait belt, and Assist x1    In place during session: None    ASSESSMENT  Pt is a 70 y.o. male with PMH of CVA with right-sided residual deficit, DM2, HTN, urinary retention w/ hx of urethral stricture, BPH admitted on 6/18/2025   after being found on the ground by his family; pt currently being treated for hypokalemia, hypomagnesia, acute cystitis with hematuria, sepsis, and Abdominal aortic aneurysm measuring 3.2 cm. Pt received sitting at the EOB with OT working with the pt on bed upon PT arrival, agreeable to evaluation. Pt A&O x 4.      Based on the objective data described below, the patient currently presents with impaired functional mobility, decreased ROM, impaired strength, decreased activity tolerance, decreased safety awareness, and impaired balance. (See below for objective details and assist levels).     Overall pt tolerated session fair today with decreased insight into deficits, require cues for initiation and sequencing. Pt has R sided residual weakness from previous CVA, weak R , require CGA/Lydia for transfers with use of RW, cues for hand placement and technique. Pt amb 20 feet (10'+10', to and from the bathroom) with Lydia, gt belt and RW; demonstrates slow nadia with decreased step  of Assist for Ambulation: Independent household ambulator, with or without device, Independent community ambulator, with or without device  Prior Level of Assist for Transfers: Independent  Active : No  Occupation: Retired    Cognitive/Behavioral Status:  Orientation  Orientation Level: Oriented X4  Cognition  Overall Cognitive Status: Exceptions  Arousal/Alertness: Delayed responses to stimuli  Following Commands: Follows one step commands with repetition;Follows one step commands with increased time  Attention Span: Attends with cues to redirect  Safety Judgement: Decreased awareness of need for safety;Decreased awareness of need for assistance  Problem Solving: Decreased awareness of errors;Assistance required to correct errors made;Assistance required to identify errors made  Insights: Decreased awareness of deficits  Initiation: Requires cues for some  Sequencing: Requires cues for some    Skin: intact where exposed    Strength:    Strength: Generally decreased, functional (R Le 2+/5 grossly, L Le 3+/5 grossly)    Range Of Motion:  AROM: Generally decreased, functional    Tone & Sensation:   Tone: Normal  Sensation: Intact    Functional Mobility:  Bed Mobility:     Bed Mobility Training  Bed Mobility Training: No  Rolling: Contact guard assistance  Supine to Sit: Minimal assistance  Scooting: Contact guard assistance  Transfers:     Transfer Training  Transfer Training: Yes  Interventions: Verbal cues;Safety awareness training;Visual cues;Tactile cues  Sit to Stand: Contact guard assistance;Minimal assistance  Stand to Sit: Minimal assistance  Stand Pivot Transfers: Minimal assistance  Toilet Transfer: Minimal assistance  Balance:     Balance  Sitting: Impaired  Sitting - Static: Good (unsupported)  Sitting - Dynamic: Fair (occasional)  Standing: Impaired  Standing - Static: Constant support;Fair  Standing - Dynamic: Constant support;Fair  Wheelchair Mobility:     Wheelchair Management  Wheelchair

## 2025-06-20 LAB
ACCESSION NUMBER, LLC1M: ABNORMAL
ACINETOBACTER CALCOAC BAUMANNII COMPLEX BY PCR: NOT DETECTED
ANION GAP SERPL CALC-SCNC: 7 MMOL/L (ref 2–12)
BACTERIA SPEC CULT: NORMAL
BACTERIA SPEC CULT: NORMAL
BACTEROIDES FRAGILIS BY PCR: NOT DETECTED
BASOPHILS # BLD: 0.02 K/UL (ref 0–0.1)
BASOPHILS NFR BLD: 0.3 % (ref 0–1)
BIOFIRE TEST COMMENT: ABNORMAL
BUN SERPL-MCNC: 9 MG/DL (ref 6–20)
BUN/CREAT SERPL: 10 (ref 12–20)
CA-I BLD-MCNC: 9 MG/DL (ref 8.5–10.1)
CANDIDA ALBICANS BY PCR: NOT DETECTED
CANDIDA AURIS BY PCR: NOT DETECTED
CANDIDA GLABRATA: NOT DETECTED
CANDIDA KRUSEI BY PCR: NOT DETECTED
CANDIDA PARAPSILOSIS BY PCR: NOT DETECTED
CANDIDA TROPICALIS BY PCR: NOT DETECTED
CHLORIDE SERPL-SCNC: 110 MMOL/L (ref 97–108)
CO2 SERPL-SCNC: 24 MMOL/L (ref 21–32)
CREAT SERPL-MCNC: 0.88 MG/DL (ref 0.7–1.3)
CRYPTOCOCCUS NEOFORMANS/GATTII BY PCR: NOT DETECTED
DIFFERENTIAL METHOD BLD: NORMAL
ENTEROBACTER CLOACAE COMPLEX BY PCR: NOT DETECTED
ENTEROBACTERALES BY PCR: NOT DETECTED
ENTEROCOCCUS FAECALIS BY PCR: NOT DETECTED
ENTEROCOCCUS FAECIUM BY PCR: NOT DETECTED
EOSINOPHIL # BLD: 0.19 K/UL (ref 0–0.4)
EOSINOPHIL NFR BLD: 2.4 % (ref 0–7)
ERYTHROCYTE [DISTWIDTH] IN BLOOD BY AUTOMATED COUNT: 13.3 % (ref 11.5–14.5)
ESCHERICHIA COLI: NOT DETECTED
GLUCOSE BLD STRIP.AUTO-MCNC: 152 MG/DL (ref 65–100)
GLUCOSE BLD STRIP.AUTO-MCNC: 161 MG/DL (ref 65–100)
GLUCOSE BLD STRIP.AUTO-MCNC: 197 MG/DL (ref 65–100)
GLUCOSE BLD STRIP.AUTO-MCNC: 225 MG/DL (ref 65–100)
GLUCOSE SERPL-MCNC: 155 MG/DL (ref 65–100)
HAEM INFLU DNA BLD POS QL NAA+NON-PROBE: NOT DETECTED
HCT VFR BLD AUTO: 36.9 % (ref 36.6–50.3)
HGB BLD-MCNC: 12.1 G/DL (ref 12.1–17)
IMM GRANULOCYTES # BLD AUTO: 0.02 K/UL (ref 0–0.04)
IMM GRANULOCYTES NFR BLD AUTO: 0.3 % (ref 0–0.5)
KLEBSIELLA AEROGENES BY PCR: NOT DETECTED
KLEBSIELLA OXYTOCA BY PCR: NOT DETECTED
KLEBSIELLA PNEUMONIAE GROUP BY PCR: NOT DETECTED
LISTERIA MONOCYTOGENES BY PCR: NOT DETECTED
LYMPHOCYTES # BLD: 1.12 K/UL (ref 0.8–3.5)
LYMPHOCYTES NFR BLD: 14.1 % (ref 12–49)
Lab: NORMAL
MAGNESIUM SERPL-MCNC: 1.7 MG/DL (ref 1.6–2.4)
MCH RBC QN AUTO: 28.7 PG (ref 26–34)
MCHC RBC AUTO-ENTMCNC: 32.8 G/DL (ref 30–36.5)
MCV RBC AUTO: 87.6 FL (ref 80–99)
MECA+MECC ISLT/SPM QL: NOT DETECTED
MONOCYTES # BLD: 0.92 K/UL (ref 0–1)
MONOCYTES NFR BLD: 11.6 % (ref 5–13)
NEISSERIA MENINGITIDIS BY PCR: NOT DETECTED
NEUTS SEG # BLD: 5.66 K/UL (ref 1.8–8)
NEUTS SEG NFR BLD: 71.3 % (ref 32–75)
NRBC # BLD: 0 K/UL (ref 0–0.01)
NRBC BLD-RTO: 0 PER 100 WBC
PERFORMED BY:: ABNORMAL
PLATELET # BLD AUTO: 246 K/UL (ref 150–400)
PMV BLD AUTO: 11.2 FL (ref 8.9–12.9)
POTASSIUM SERPL-SCNC: 3 MMOL/L (ref 3.5–5.1)
PROCALCITONIN SERPL-MCNC: 0.11 NG/ML
PROTEUS BY PCR: NOT DETECTED
PSEUDOMONAS AERUGINOSA, PSAEP: NOT DETECTED
RBC # BLD AUTO: 4.21 M/UL (ref 4.1–5.7)
RESISTANT GENE TARGETS: ABNORMAL
SALMONELLA DNA BLD POS QL NAA+NON-PROBE: NOT DETECTED
SERRATIA MARCESCENS BY PCR: NOT DETECTED
SODIUM SERPL-SCNC: 141 MMOL/L (ref 136–145)
STAPHYLOCOCCUS AUREUS: NOT DETECTED
STAPHYLOCOCCUS EPIDERMIDIS BY PCR: DETECTED
STAPHYLOCOCCUS LUGDUNENSIS BY PCR: NOT DETECTED
STAPHYLOCOCCUS: DETECTED
STENOTROPHOMONAS MALTOPHILIA BY PCR: NOT DETECTED
STREPTOCOCCUS AGALACTIAE (GROUP B): NOT DETECTED
STREPTOCOCCUS PNEUMONIAE , SPNP: NOT DETECTED
STREPTOCOCCUS PYOGENES (GROUP A), SPYOP: NOT DETECTED
STREPTOCOCCUS: NOT DETECTED
WBC # BLD AUTO: 7.9 K/UL (ref 4.1–11.1)

## 2025-06-20 PROCEDURE — 2580000003 HC RX 258: Performed by: INTERNAL MEDICINE

## 2025-06-20 PROCEDURE — 6360000002 HC RX W HCPCS: Performed by: INTERNAL MEDICINE

## 2025-06-20 PROCEDURE — 97535 SELF CARE MNGMENT TRAINING: CPT

## 2025-06-20 PROCEDURE — 6360000002 HC RX W HCPCS: Performed by: STUDENT IN AN ORGANIZED HEALTH CARE EDUCATION/TRAINING PROGRAM

## 2025-06-20 PROCEDURE — 85025 COMPLETE CBC W/AUTO DIFF WBC: CPT

## 2025-06-20 PROCEDURE — 84145 PROCALCITONIN (PCT): CPT

## 2025-06-20 PROCEDURE — 82962 GLUCOSE BLOOD TEST: CPT

## 2025-06-20 PROCEDURE — 83735 ASSAY OF MAGNESIUM: CPT

## 2025-06-20 PROCEDURE — 36415 COLL VENOUS BLD VENIPUNCTURE: CPT

## 2025-06-20 PROCEDURE — 1100000000 HC RM PRIVATE

## 2025-06-20 PROCEDURE — 80048 BASIC METABOLIC PNL TOTAL CA: CPT

## 2025-06-20 PROCEDURE — 6370000000 HC RX 637 (ALT 250 FOR IP): Performed by: STUDENT IN AN ORGANIZED HEALTH CARE EDUCATION/TRAINING PROGRAM

## 2025-06-20 PROCEDURE — 2580000003 HC RX 258: Performed by: STUDENT IN AN ORGANIZED HEALTH CARE EDUCATION/TRAINING PROGRAM

## 2025-06-20 PROCEDURE — 2500000003 HC RX 250 WO HCPCS: Performed by: STUDENT IN AN ORGANIZED HEALTH CARE EDUCATION/TRAINING PROGRAM

## 2025-06-20 RX ORDER — POTASSIUM CHLORIDE 1500 MG/1
40 TABLET, EXTENDED RELEASE ORAL 2 TIMES DAILY
Status: COMPLETED | OUTPATIENT
Start: 2025-06-20 | End: 2025-06-21

## 2025-06-20 RX ADMIN — TAMSULOSIN HYDROCHLORIDE 0.4 MG: 0.4 CAPSULE ORAL at 09:53

## 2025-06-20 RX ADMIN — POTASSIUM CHLORIDE 40 MEQ: 1500 TABLET, EXTENDED RELEASE ORAL at 12:03

## 2025-06-20 RX ADMIN — METFORMIN HYDROCHLORIDE 500 MG: 500 TABLET ORAL at 17:53

## 2025-06-20 RX ADMIN — PIPERACILLIN AND TAZOBACTAM 3375 MG: 3; .375 INJECTION, POWDER, LYOPHILIZED, FOR SOLUTION INTRAVENOUS at 04:34

## 2025-06-20 RX ADMIN — ATORVASTATIN CALCIUM 40 MG: 40 TABLET, FILM COATED ORAL at 09:55

## 2025-06-20 RX ADMIN — INSULIN LISPRO 2 UNITS: 100 INJECTION, SOLUTION INTRAVENOUS; SUBCUTANEOUS at 21:14

## 2025-06-20 RX ADMIN — METHENAMINE HIPPURATE 1 G: 1 TABLET ORAL at 17:53

## 2025-06-20 RX ADMIN — SODIUM CHLORIDE, PRESERVATIVE FREE 10 ML: 5 INJECTION INTRAVENOUS at 09:45

## 2025-06-20 RX ADMIN — POTASSIUM CHLORIDE 40 MEQ: 1500 TABLET, EXTENDED RELEASE ORAL at 20:50

## 2025-06-20 RX ADMIN — INSULIN LISPRO 2 UNITS: 100 INJECTION, SOLUTION INTRAVENOUS; SUBCUTANEOUS at 12:04

## 2025-06-20 RX ADMIN — METHENAMINE HIPPURATE 1 G: 1 TABLET ORAL at 09:54

## 2025-06-20 RX ADMIN — VANCOMYCIN HYDROCHLORIDE 1000 MG: 1 INJECTION, POWDER, LYOPHILIZED, FOR SOLUTION INTRAVENOUS at 03:31

## 2025-06-20 RX ADMIN — METFORMIN HYDROCHLORIDE 500 MG: 500 TABLET ORAL at 09:55

## 2025-06-20 RX ADMIN — PIPERACILLIN AND TAZOBACTAM 3375 MG: 3; .375 INJECTION, POWDER, LYOPHILIZED, FOR SOLUTION INTRAVENOUS at 20:55

## 2025-06-20 RX ADMIN — LOSARTAN POTASSIUM 100 MG: 50 TABLET, FILM COATED ORAL at 09:55

## 2025-06-20 RX ADMIN — PIPERACILLIN AND TAZOBACTAM 3375 MG: 3; .375 INJECTION, POWDER, LYOPHILIZED, FOR SOLUTION INTRAVENOUS at 12:06

## 2025-06-20 RX ADMIN — AMLODIPINE BESYLATE 5 MG: 5 TABLET ORAL at 09:54

## 2025-06-20 RX ADMIN — INSULIN LISPRO 2 UNITS: 100 INJECTION, SOLUTION INTRAVENOUS; SUBCUTANEOUS at 17:53

## 2025-06-20 RX ADMIN — SODIUM CHLORIDE, PRESERVATIVE FREE 10 ML: 5 INJECTION INTRAVENOUS at 21:15

## 2025-06-20 ASSESSMENT — ENCOUNTER SYMPTOMS
SHORTNESS OF BREATH: 0
VOMITING: 0
NAUSEA: 0
DIARRHEA: 0
ABDOMINAL PAIN: 0
BACK PAIN: 0
COUGH: 0

## 2025-06-20 ASSESSMENT — PAIN SCALES - GENERAL: PAINLEVEL_OUTOF10: 0

## 2025-06-20 NOTE — PROGRESS NOTES
OCCUPATIONAL THERAPY TREATMENT  Patient: Stewart Ramos (70 y.o. male)  Date: 6/20/2025  Primary Diagnosis: Hypokalemia [E87.6]  Hypomagnesemia [E83.42]  Enlarged prostate [N40.0]  Acute cystitis with hematuria [N30.01]  Stroke-like symptoms [R29.90]  Sepsis (HCC) [A41.9]  Sepsis, due to unspecified organism, unspecified whether acute organ dysfunction present (HCC) [A41.9]       Precautions: Fall Risk, Bed Alarm                Recommendations for nursing mobility: Out of bed to chair for meals, Encourage HEP in prep for ADLs/mobility; see handout for details, Frequent repositioning to prevent skin breakdown, Use of bed/chair alarm for safety, LE elevation for management of edema, AD and gt belt for bed to chair , Amb to bathroom with AD and gait belt, and Assist x1    In place during session: Peripheral IV and [place] Catheter  Chart, occupational therapy assessment, plan of care, and goals were reviewed.  ASSESSMENT  Patient continues with skilled OT services and is progressing towards goals. Pt semi supine upon DAVIS arrival, agreeable to session. Pt A&O x 4. Pt completed bed mobility with SBA.  Pt sat EOB and donned crocs with set up. Pt was CGA for functional ambulation into the bathroom.  Pt required vcs for safe toilet transfer, and set up A of wipes.  Pt able to complete posterior hygiene while seated on commode. Pt ambulated to sink and stood to complete hand and facial  hygiene.  Pt declined oral care. Pt ambulated from bathroom, around bed and sat in recliner.  Pt instructed on how to thread catheter through briefs.  Pt donned briefs with set up and SBA when standing. Therapist provided chair alarm pad, box and wires.  Pt left in recliner with all needs met. (See below for objective details and assist levels).     Overall pt tolerated session fair today with bed mobility, functional ambulation, toileting and grooming. Current OT recommendations for discharge Moderate intensity short-term skilled occupational  therapy up to 5x/week. Will continue to benefit from skilled OT services, and will continue to progress as tolerated.       GOALS:    Problem: Occupational Therapy - Adult  Goal: By Discharge: Performs self-care activities at highest level of function for planned discharge setting.  See evaluation for individualized goals.  Description: FUNCTIONAL STATUS PRIOR TO ADMISSION:  Pt was modified independent using RW for functional mobility. Pt was independent with ADLs.    HOME SUPPORT: The patient lived alone.    Occupational Therapy Goals:  Initiated 6/19/2025  Patient/Family stated goal: Go home  1.  Patient will perform lower body dressing with Modified [place] within 7 day(s).  2.  Patient will perform grooming with Modified [place] within 7 day(s).  3.  Patient will perform bathing with Modified [place] within 7 day(s).  4.  Patient will perform toilet transfers with Modified [place]  within 7 day(s).  5.  Patient will perform all aspects of toileting with Modified [place] within 7 day(s).  6.  Patient will participate in upper extremity therapeutic exercise/activities with [place] within 7 day(s).    Outcome: Progressing        PLAN :  Patient continues to benefit from skilled intervention to address functional impairments. Continue treatment per established plan of care to address goals.    Recommend next OT session: LB dressing, UB bathing, and LB bathing    Recommendation for discharge: (in order for the patient to meet his/her long term goals): Moderate intensity short-term skilled occupational therapy up to 5x/week    Potential barriers for safe discharge: pt is a high fall risk, pt is not safe to be alone, and concern for pt safely navigating or managing the home environment.     IF patient discharges home will need the following DME: continuing to assess with progress     SUBJECTIVE:   Patient stated “I can wear them with this thing?” pt referring to briefs and

## 2025-06-20 NOTE — PLAN OF CARE
Problem: Chronic Conditions and Co-morbidities  Goal: Patient's chronic conditions and co-morbidity symptoms are monitored and maintained or improved  6/20/2025 1353 by Esther Amor RN  Outcome: Progressing     Problem: Discharge Planning  Goal: Discharge to home or other facility with appropriate resources  6/20/2025 1353 by Esther Amor RN  Outcome: Progressing     Problem: Pain  Goal: Verbalizes/displays adequate comfort level or baseline comfort level  6/20/2025 1353 by Etsher Amor RN  Outcome: Progressing     Problem: Safety - Adult  Goal: Free from fall injury  6/20/2025 1353 by Esther Amor RN  Outcome: Progressing     Problem: Skin/Tissue Integrity  Goal: Skin integrity remains intact  Description: 1.  Monitor for areas of redness and/or skin breakdown  2.  Assess vascular access sites hourly  3.  Every 4-6 hours minimum:  Change oxygen saturation probe site  4.  Every 4-6 hours:  If on nasal continuous positive airway pressure, respiratory therapy assess nares and determine need for appliance change or resting period  6/20/2025 1353 by Esther Amor RN  Outcome: Progressing  6/20/2025 0145 by Claude Hinds RN  Outcome: Progressing  Flowsheets (Taken 6/19/2025 2120)  Skin Integrity Remains Intact:   Monitor for areas of redness and/or skin breakdown   Assess vascular access sites hourly   Every 4-6 hours minimum:  Change oxygen saturation probe site   Every 4-6 hours:  If on nasal continuous positive airway pressure, assess nares and determine need for appliance change or resting period   Assess need for specialty bed   Positioning devices   Pressure redistribution bed/mattress (bed type)   Monitor skin under medical devices     Problem: Genitourinary - Adult  Goal: Absence of urinary retention  6/20/2025 1353 by Esther Amor RN  Outcome: Progressing     Problem: Genitourinary - Adult  Goal: Urinary catheter remains patent  6/20/2025 1353 by Esther Amor

## 2025-06-20 NOTE — PROGRESS NOTES
Hospitalist Progress Note    NAME:   Stewart Ramos   : 1955   MRN: [id number]     Date/Time: 2025 9:39 AM  Patient PCP: Roel Benítez MD    Estimated discharge date:24hrs  Barriers: IV abx, urine culture    Hospital course:  Stewart Ramos is a 70 y.o.  male with PMHx significant for CVA with right-sided residual deficit, DM2, HTN, urinary retention w/ hx of urethral stricture, BPH who was admitted for UTI after being found down at home and more altered than normal. Patient was recently seen in Urology office and was diagnosed with UTI, started on doxycycline. In the ED patient febrile 101.2, tachycardic, not hypotensive. UA nitrate positive, 2+ bacteria with hematuria. CT of the head with no acute abnormality.  CTA head and neck with no large vessel occlusion, status post left carotid stenting with patent stent, approximately 60% stenosis right ICA.  CT abdomen pelvis with 3.2 cm abdominal aortic aneurysm, otherwise no acute intra-abdominal pathology. Labs significant for leukocytosis and elevated procal. Patient was started on IV Zosyn and Vancomycin. Patient also with urinary retention in the ED, del valle catheter subsequently placed. Noted to have significant clots and hematuria per nursing staff. Urology subsequently consulted recommended continued del valle, will likely need cystoscopy w/ uroflow outpatient.     Assessment / Plan:  Urinary tract infection  Metabolic encephalopathy 2/2 above, improved   Urinary retention with history of urethral stricture status post dilation with urology 6/10  Leukocytosis 15,200, resolved WBC 10.2  Lactic acid within normal limits  Procal 0.27-0.25  UA grossly infected with pyuria, hematuria, 2+ bacteria and nitrate positive  Blood cultures 1/4 + G+ cocci in clusters, likely contaminate. PCR pending.   Continue IV Zosyn and Vancomycin  Will DC acuna, MRSA PCR negatve  No previous urine cultures in EMR  Urine culture pending   CT abdomen pelvis with 3.2 cm

## 2025-06-20 NOTE — CARE COORDINATION
DCP: home self care with daughter, pt declined SNF, HH referrals sent pending acceptance  GURWINDER: 48 hours    Pt is awaiting clinical improvement.     CM reviewed pt chart observed recs for SNF. CM to meet with pt to discuss recs.     1412: CM met with pt at bedside to discuss recs for SNF. Pt declined SNF placement. CM asked about arranging HH. Pt agreeable to home health and has no preference of agency. Signed choice form placed on bedside chart. Referrals to be sent.     CM team following.

## 2025-06-20 NOTE — PLAN OF CARE
Patient Aox4, denies pain.In company of family members.Canales in place, actively draining clear urine.Assessments completed, due meds given.Will continue to monitor.  Problem: Chronic Conditions and Co-morbidities  Goal: Patient's chronic conditions and co-morbidity symptoms are monitored and maintained or improved  6/20/2025 0145 by Claude Hinds, RN  Outcome: Progressing  Flowsheets (Taken 6/19/2025 2120)  Care Plan - Patient's Chronic Conditions and Co-Morbidity Symptoms are Monitored and Maintained or Improved:   Monitor and assess patient's chronic conditions and comorbid symptoms for stability, deterioration, or improvement   Collaborate with multidisciplinary team to address chronic and comorbid conditions and prevent exacerbation or deterioration   Update acute care plan with appropriate goals if chronic or comorbid symptoms are exacerbated and prevent overall improvement and discharge  6/19/2025 1551 by Ileana Fernández RN  Outcome: Progressing  Flowsheets (Taken 6/19/2025 0923)  Care Plan - Patient's Chronic Conditions and Co-Morbidity Symptoms are Monitored and Maintained or Improved: Monitor and assess patient's chronic conditions and comorbid symptoms for stability, deterioration, or improvement     Problem: Discharge Planning  Goal: Discharge to home or other facility with appropriate resources  6/20/2025 0145 by Claude Hinds, RN  Outcome: Progressing  Flowsheets (Taken 6/19/2025 2120)  Discharge to home or other facility with appropriate resources: Identify barriers to discharge with patient and caregiver  6/19/2025 1551 by Ileana Fernández RN  Outcome: Progressing  Flowsheets (Taken 6/19/2025 0923)  Discharge to home or other facility with appropriate resources: Identify barriers to discharge with patient and caregiver     Problem: Pain  Goal: Verbalizes/displays adequate comfort level or baseline comfort level  6/20/2025 0145 by Claude Hinds, RN  Outcome: Progressing  6/19/2025 1551 by  Ileana Fernández RN  Outcome: Progressing     Problem: Safety - Adult  Goal: Free from fall injury  6/20/2025 0145 by Claude Hinds RN  Outcome: Progressing  6/19/2025 1551 by Ileana Fernández RN  Outcome: Progressing     Problem: Skin/Tissue Integrity  Goal: Skin integrity remains intact  Description: 1.  Monitor for areas of redness and/or skin breakdown  2.  Assess vascular access sites hourly  3.  Every 4-6 hours minimum:  Change oxygen saturation probe site  4.  Every 4-6 hours:  If on nasal continuous positive airway pressure, respiratory therapy assess nares and determine need for appliance change or resting period  6/20/2025 0145 by Claude Hinds RN  Outcome: Progressing  Flowsheets (Taken 6/19/2025 2120)  Skin Integrity Remains Intact:   Monitor for areas of redness and/or skin breakdown   Assess vascular access sites hourly   Every 4-6 hours minimum:  Change oxygen saturation probe site   Every 4-6 hours:  If on nasal continuous positive airway pressure, assess nares and determine need for appliance change or resting period   Assess need for specialty bed   Positioning devices   Pressure redistribution bed/mattress (bed type)   Monitor skin under medical devices  6/19/2025 1551 by Ileana Fernández RN  Outcome: Progressing     Problem: Genitourinary - Adult  Goal: Absence of urinary retention  6/20/2025 0145 by Claude Hinds RN  Outcome: Progressing  6/19/2025 1551 by Ileana Fernández RN  Outcome: Progressing  Flowsheets (Taken 6/19/2025 0923)  Absence of urinary retention: Assess patient’s ability to void and empty bladder  Goal: Urinary catheter remains patent  6/20/2025 0145 by Claude Hinds RN  Outcome: Progressing  Flowsheets (Taken 6/19/2025 2120)  Urinary catheter remains patent:   Assess patency of urinary catheter   Assess need for a larger catheter size or a 3-way catheter for continuous bladder irrigation  6/19/2025 1551 by Ileana Fernández RN  Outcome:

## 2025-06-21 VITALS
HEART RATE: 68 BPM | HEIGHT: 67 IN | RESPIRATION RATE: 14 BRPM | BODY MASS INDEX: 30.13 KG/M2 | DIASTOLIC BLOOD PRESSURE: 65 MMHG | OXYGEN SATURATION: 94 % | TEMPERATURE: 98.1 F | WEIGHT: 192 LBS | SYSTOLIC BLOOD PRESSURE: 132 MMHG

## 2025-06-21 LAB
ANION GAP SERPL CALC-SCNC: 4 MMOL/L (ref 2–12)
BACTERIA SPEC CULT: NORMAL
BASOPHILS # BLD: 0.02 K/UL (ref 0–0.1)
BASOPHILS NFR BLD: 0.3 % (ref 0–1)
BUN SERPL-MCNC: 9 MG/DL (ref 6–20)
BUN/CREAT SERPL: 11 (ref 12–20)
CA-I BLD-MCNC: 8.5 MG/DL (ref 8.5–10.1)
CHLORIDE SERPL-SCNC: 113 MMOL/L (ref 97–108)
CO2 SERPL-SCNC: 26 MMOL/L (ref 21–32)
CREAT SERPL-MCNC: 0.83 MG/DL (ref 0.7–1.3)
DIFFERENTIAL METHOD BLD: ABNORMAL
EOSINOPHIL # BLD: 0.22 K/UL (ref 0–0.4)
EOSINOPHIL NFR BLD: 3.3 % (ref 0–7)
ERYTHROCYTE [DISTWIDTH] IN BLOOD BY AUTOMATED COUNT: 13.2 % (ref 11.5–14.5)
GLUCOSE BLD STRIP.AUTO-MCNC: 166 MG/DL (ref 65–100)
GLUCOSE SERPL-MCNC: 185 MG/DL (ref 65–100)
HCT VFR BLD AUTO: 34.6 % (ref 36.6–50.3)
HGB BLD-MCNC: 11.5 G/DL (ref 12.1–17)
IMM GRANULOCYTES # BLD AUTO: 0.02 K/UL (ref 0–0.04)
IMM GRANULOCYTES NFR BLD AUTO: 0.3 % (ref 0–0.5)
LYMPHOCYTES # BLD: 1.33 K/UL (ref 0.8–3.5)
LYMPHOCYTES NFR BLD: 19.8 % (ref 12–49)
Lab: NORMAL
MCH RBC QN AUTO: 28.5 PG (ref 26–34)
MCHC RBC AUTO-ENTMCNC: 33.2 G/DL (ref 30–36.5)
MCV RBC AUTO: 85.6 FL (ref 80–99)
MONOCYTES # BLD: 0.79 K/UL (ref 0–1)
MONOCYTES NFR BLD: 11.8 % (ref 5–13)
NEUTS SEG # BLD: 4.34 K/UL (ref 1.8–8)
NEUTS SEG NFR BLD: 64.5 % (ref 32–75)
NRBC # BLD: 0 K/UL (ref 0–0.01)
NRBC BLD-RTO: 0 PER 100 WBC
PERFORMED BY:: ABNORMAL
PLATELET # BLD AUTO: 268 K/UL (ref 150–400)
PMV BLD AUTO: 11.2 FL (ref 8.9–12.9)
POTASSIUM SERPL-SCNC: 3.8 MMOL/L (ref 3.5–5.1)
PROCALCITONIN SERPL-MCNC: 0.06 NG/ML
RBC # BLD AUTO: 4.04 M/UL (ref 4.1–5.7)
SODIUM SERPL-SCNC: 143 MMOL/L (ref 136–145)
WBC # BLD AUTO: 6.7 K/UL (ref 4.1–11.1)

## 2025-06-21 PROCEDURE — 80048 BASIC METABOLIC PNL TOTAL CA: CPT

## 2025-06-21 PROCEDURE — 6370000000 HC RX 637 (ALT 250 FOR IP): Performed by: STUDENT IN AN ORGANIZED HEALTH CARE EDUCATION/TRAINING PROGRAM

## 2025-06-21 PROCEDURE — 2500000003 HC RX 250 WO HCPCS: Performed by: STUDENT IN AN ORGANIZED HEALTH CARE EDUCATION/TRAINING PROGRAM

## 2025-06-21 PROCEDURE — 84145 PROCALCITONIN (PCT): CPT

## 2025-06-21 PROCEDURE — 6360000002 HC RX W HCPCS: Performed by: STUDENT IN AN ORGANIZED HEALTH CARE EDUCATION/TRAINING PROGRAM

## 2025-06-21 PROCEDURE — 2580000003 HC RX 258: Performed by: STUDENT IN AN ORGANIZED HEALTH CARE EDUCATION/TRAINING PROGRAM

## 2025-06-21 PROCEDURE — 85025 COMPLETE CBC W/AUTO DIFF WBC: CPT

## 2025-06-21 PROCEDURE — 82962 GLUCOSE BLOOD TEST: CPT

## 2025-06-21 PROCEDURE — 36415 COLL VENOUS BLD VENIPUNCTURE: CPT

## 2025-06-21 RX ORDER — CIPROFLOXACIN 750 MG/1
750 TABLET, FILM COATED ORAL 2 TIMES DAILY
Qty: 8 TABLET | Refills: 0 | Status: SHIPPED | OUTPATIENT
Start: 2025-06-21 | End: 2025-06-25

## 2025-06-21 RX ADMIN — PIPERACILLIN AND TAZOBACTAM 3375 MG: 3; .375 INJECTION, POWDER, LYOPHILIZED, FOR SOLUTION INTRAVENOUS at 03:35

## 2025-06-21 RX ADMIN — METHENAMINE HIPPURATE 1 G: 1 TABLET ORAL at 08:21

## 2025-06-21 RX ADMIN — AMLODIPINE BESYLATE 5 MG: 5 TABLET ORAL at 08:16

## 2025-06-21 RX ADMIN — POTASSIUM CHLORIDE 40 MEQ: 1500 TABLET, EXTENDED RELEASE ORAL at 08:16

## 2025-06-21 RX ADMIN — LOSARTAN POTASSIUM 100 MG: 50 TABLET, FILM COATED ORAL at 08:17

## 2025-06-21 RX ADMIN — SODIUM CHLORIDE, PRESERVATIVE FREE 10 ML: 5 INJECTION INTRAVENOUS at 08:17

## 2025-06-21 RX ADMIN — ATORVASTATIN CALCIUM 40 MG: 40 TABLET, FILM COATED ORAL at 08:16

## 2025-06-21 RX ADMIN — TAMSULOSIN HYDROCHLORIDE 0.4 MG: 0.4 CAPSULE ORAL at 08:17

## 2025-06-21 RX ADMIN — METFORMIN HYDROCHLORIDE 500 MG: 500 TABLET ORAL at 08:16

## 2025-06-21 NOTE — PLAN OF CARE
Patient Aox4, in bed, denies pain, del valle now draining clear urine.Assessments completed,due meds given.Patient will need rehab per PT.Still on antibiotics for UTI.Will continue to monitor.  Problem: Chronic Conditions and Co-morbidities  Goal: Patient's chronic conditions and co-morbidity symptoms are monitored and maintained or improved  6/21/2025 0226 by Claude Hinds RN  Outcome: Progressing  6/20/2025 1353 by Esther Amor RN  Outcome: Progressing     Problem: Discharge Planning  Goal: Discharge to home or other facility with appropriate resources  6/20/2025 1353 by Esther Amor RN  Outcome: Progressing     Problem: Pain  Goal: Verbalizes/displays adequate comfort level or baseline comfort level  6/21/2025 0226 by Claude Hinds RN  Outcome: Progressing  6/20/2025 1353 by Esther Amor RN  Outcome: Progressing     Problem: Safety - Adult  Goal: Free from fall injury  6/21/2025 0226 by Claude Hinds RN  Outcome: Progressing  6/20/2025 1353 by Esther Amor RN  Outcome: Progressing     Problem: Skin/Tissue Integrity  Goal: Skin integrity remains intact  Description: 1.  Monitor for areas of redness and/or skin breakdown  2.  Assess vascular access sites hourly  3.  Every 4-6 hours minimum:  Change oxygen saturation probe site  4.  Every 4-6 hours:  If on nasal continuous positive airway pressure, respiratory therapy assess nares and determine need for appliance change or resting period  6/21/2025 0226 by Claude Hinds RN  Outcome: Progressing  Flowsheets (Taken 6/20/2025 2045)  Skin Integrity Remains Intact:   Monitor for areas of redness and/or skin breakdown   Assess vascular access sites hourly   Every 4-6 hours minimum:  Change oxygen saturation probe site   Positioning devices  6/20/2025 1353 by Esther Amor RN  Outcome: Progressing     Problem: Genitourinary - Adult  Goal: Absence of urinary retention  6/21/2025 0226 by Claude Hinds RN  Outcome:

## 2025-06-21 NOTE — PLAN OF CARE
Problem: Chronic Conditions and Co-morbidities  Goal: Patient's chronic conditions and co-morbidity symptoms are monitored and maintained or improved  6/21/2025 0951 by Esther Amor RN  Outcome: Adequate for Discharge  6/21/2025 0226 by Claude Hinds RN  Outcome: Progressing     Problem: Discharge Planning  Goal: Discharge to home or other facility with appropriate resources  Outcome: Adequate for Discharge     Problem: Pain  Goal: Verbalizes/displays adequate comfort level or baseline comfort level  6/21/2025 0951 by Esther Amor RN  Outcome: Adequate for Discharge  6/21/2025 0226 by Claude Hinds RN  Outcome: Progressing     Problem: Safety - Adult  Goal: Free from fall injury  6/21/2025 0951 by Esther Aomr RN  Outcome: Adequate for Discharge  6/21/2025 0226 by Claude Hinds RN  Outcome: Progressing     Problem: Infection - Adult  Goal: Absence of infection at discharge  Outcome: Adequate for Discharge  Flowsheets (Taken 6/20/2025 2045 by Claude Hinds RN)  Absence of infection at discharge:   Assess and monitor for signs and symptoms of infection   Monitor lab/diagnostic results   Monitor all insertion sites i.e., indwelling lines, tubes and drains  Goal: Absence of infection during hospitalization  6/21/2025 0951 by Esther Amor RN  Outcome: Adequate for Discharge  6/21/2025 0226 by Claude Hinds RN  Outcome: Progressing  Goal: Absence of fever/infection during anticipated neutropenic period  Outcome: Adequate for Discharge     Problem: Genitourinary - Adult  Goal: Absence of urinary retention  6/21/2025 0951 by Esther Amor RN  Outcome: Adequate for Discharge  6/21/2025 0226 by Claude Hinds RN  Outcome: Progressing  Goal: Urinary catheter remains patent  6/21/2025 0951 by Esther Amor RN  Outcome: Adequate for Discharge  6/21/2025 0226 by Claude Hinds RN  Outcome: Progressing     Problem: Metabolic/Fluid and Electrolytes - Adult  Goal:

## 2025-06-21 NOTE — CARE COORDINATION
Transition of Care Plan:    RUR: 12%  Prior Level of Functioning: independent  Disposition: home health  GURWINDER: 6/21/25  If SNF or IPR: Date FOC offered: na  Date FOC received: na  [facility]   Date authorization started with reference number: na  Date authorization received and expires: na  Follow up appointments: na  DME needed: na  Transportation at discharge: patient arranged  IM/IMM Medicare/ letter given: yes  Is patient a [place] and connected with [facility]? na   If yes, was [place] transfer form completed and [facility] notified? na  Caregiver Contact: patient  Discharge Caregiver contacted prior to discharge? Patient aware  Care Conference needed? na  Barriers to discharge: na

## 2025-06-21 NOTE — DISCHARGE SUMMARY
Discharge Summary    Name: Stewart Ramos  [id number]  YOB: 1955 (Age: 70 y.o.)   Date of Admission: 6/18/2025  Date of Discharge: 6/21/2025  Attending Physician: Douglas Palomo MD    Discharge Diagnosis:   Principal Problem:    Sepsis (HCC)  Active problems:  CVA with right-sided residual deficit  DM2  Hypertension  Urinary retention  UTI  Resolved Problems:    * No resolved hospital problems. *       Consultations:  IP CONSULT TO PHARMACY  IP CONSULT TO UROLOGY      Brief Admission History/Reason for Admission Per Paulina Martinez MD:   Brief Hospital Course by Main Problems:   Stewart Ramos is a 70 y.o.  male with PMHx significant for CVA with right-sided residual deficit, DM2, HTN, urinary retention w/ hx of urethral stricture, BPH who was admitted for UTI after being found down at home and more altered than normal. Patient was recently seen in Urology office and was diagnosed with UTI, started on doxycycline. In the ED patient febrile 101.2, tachycardic, not hypotensive. UA nitrate positive, 2+ bacteria with hematuria. CT of the head with no acute abnormality.  CTA head and neck with no large vessel occlusion, status post left carotid stenting with patent stent, approximately 60% stenosis right ICA.  CT abdomen pelvis with 3.2 cm abdominal aortic aneurysm, otherwise no acute intra-abdominal pathology. Labs significant for leukocytosis and elevated procal. Patient was started on IV Zosyn and Vancomycin. Patient also with urinary retention in the ED, del valle catheter subsequently placed. Noted to have significant clots and hematuria per nursing staff. Urology subsequently consulted recommended continued del valle, will likely need cystoscopy w/ uroflow outpatient. Hematuria gradually resolved. Leukocytosis and fever resolved with antibiotics. Procal downtrending. Unfortunately initial UA was not sent for culture, urine culture that was obtained negative for growth

## 2025-06-22 LAB
BACTERIA SPEC CULT: NORMAL
Lab: NORMAL

## 2025-06-24 ENCOUNTER — OFFICE VISIT (OUTPATIENT)
Age: 70
End: 2025-06-24
Payer: MEDICARE

## 2025-06-24 VITALS — DIASTOLIC BLOOD PRESSURE: 78 MMHG | HEART RATE: 87 BPM | SYSTOLIC BLOOD PRESSURE: 153 MMHG

## 2025-06-24 DIAGNOSIS — R33.9 URINARY RETENTION: ICD-10-CM

## 2025-06-24 DIAGNOSIS — R97.20 ELEVATED PSA: Primary | ICD-10-CM

## 2025-06-24 LAB
BACTERIA SPEC CULT: NORMAL
GLUCOSE BLD STRIP.AUTO-MCNC: 219 MG/DL (ref 65–100)
Lab: NORMAL
PERFORMED BY:: ABNORMAL

## 2025-06-24 PROCEDURE — 99212 OFFICE O/P EST SF 10 MIN: CPT | Performed by: NURSE PRACTITIONER

## 2025-06-24 PROCEDURE — G8417 CALC BMI ABV UP PARAM F/U: HCPCS | Performed by: NURSE PRACTITIONER

## 2025-06-24 PROCEDURE — 1111F DSCHRG MED/CURRENT MED MERGE: CPT | Performed by: NURSE PRACTITIONER

## 2025-06-24 PROCEDURE — 1036F TOBACCO NON-USER: CPT | Performed by: NURSE PRACTITIONER

## 2025-06-24 PROCEDURE — 1123F ACP DISCUSS/DSCN MKR DOCD: CPT | Performed by: NURSE PRACTITIONER

## 2025-06-24 PROCEDURE — 1159F MED LIST DOCD IN RCRD: CPT | Performed by: NURSE PRACTITIONER

## 2025-06-24 PROCEDURE — 3017F COLORECTAL CA SCREEN DOC REV: CPT | Performed by: NURSE PRACTITIONER

## 2025-06-24 PROCEDURE — G8427 DOCREV CUR MEDS BY ELIG CLIN: HCPCS | Performed by: NURSE PRACTITIONER

## 2025-06-24 NOTE — PROGRESS NOTES
Chief Complaint   Patient presents with    Follow-up     PVR    1. Have you been to the ER, urgent care clinic since your last visit?  Hospitalized since your last visit?YES, 06-    2. Have you seen or consulted any other health care providers outside of the [facility] since your last visit?  Include any pap smears or colon screening. No  BP (!) 153/78 (BP Site: Right Upper Arm, Patient Position: Sitting, BP Cuff Size: Medium Adult)   Pulse 87

## 2025-06-25 DIAGNOSIS — R97.20 ELEVATED PSA: ICD-10-CM

## 2025-06-25 NOTE — ASSESSMENT & PLAN NOTE
Canales catheter placed during recent admission to the hospital on 6/19/2025  - He will continue with flomax and ciprofloxacin  Plan for cystoscopy and urodynamics on 6/30/2025

## 2025-06-25 NOTE — PROGRESS NOTES
HISTORY OF PRESENT ILLNESS    Stewart Ramos is a 70 y.o. male  with h/o urinary retention,BPH is here for follow up after hospital admission last week. He was admitted to the hospital on 6/18/2025 after being found down at home and had altered mental status. He was found to have UTI ( treated with IV abx) and urinary retention. Del Valle catheter was placed. He was d/c home on ciprofloxacin.  Urine culture came back negative.  Prior to hospital admission,he had voiding trial here in the office on 6/16/2025; he was able to void half of instilled fluid and was placed on Flomax. He had a follow up appointment for  residual check and cystoscopy.    Chief Complaint   Patient presents with    Follow-up     PVR            Past Medical History:  PMHx (including negatives):  has a past medical history of CVA (cerebral vascular accident) (HCC), Diabetes mellitus (HCC), Hepatitis C, Hypertension, Sleep apnea, and Urinary retention.   PSurgHx:  has a past surgical history that includes Carotid artery surgery (Left); invasive vascular (N/A, 6/10/2025); invasive vascular (N/A, 6/10/2025); invasive vascular (N/A, 6/10/2025); invasive vascular (N/A, 6/10/2025); invasive vascular (N/A, 6/10/2025); invasive vascular (N/A, 6/10/2025); and invasive vascular (N/A, 6/10/2025).  PSocHx:  reports that he quit smoking about 18 years ago. His smoking use included cigarettes. He started smoking about 55 years ago. He has a 18.3 pack-year smoking history. He has been exposed to tobacco smoke. He has never used smokeless tobacco. He reports that he does not drink alcohol and does not use drugs.     1. Urinary retention  Overview:  S/p del valle catheter placement and urethral stricture dilation on 6/10/2025  PSA pending   Del Valle catheter emoved on 6/16/2025  Assessment & Plan:   Del Valle catheter placed during recent admission to the hospital on 6/19/2025  - He will continue with flomax and ciprofloxacin  Plan for cystoscopy and urodynamics on 6/30/2025

## 2025-06-30 ENCOUNTER — OFFICE VISIT (OUTPATIENT)
Age: 70
End: 2025-06-30
Payer: MEDICARE

## 2025-06-30 VITALS
TEMPERATURE: 98 F | HEART RATE: 79 BPM | SYSTOLIC BLOOD PRESSURE: 112 MMHG | DIASTOLIC BLOOD PRESSURE: 70 MMHG | BODY MASS INDEX: 29.58 KG/M2 | HEIGHT: 67 IN | RESPIRATION RATE: 23 BRPM | OXYGEN SATURATION: 98 % | WEIGHT: 188.5 LBS

## 2025-06-30 DIAGNOSIS — I73.9 PAD (PERIPHERAL ARTERY DISEASE): Primary | ICD-10-CM

## 2025-06-30 PROCEDURE — 1111F DSCHRG MED/CURRENT MED MERGE: CPT | Performed by: SURGERY

## 2025-06-30 PROCEDURE — 99212 OFFICE O/P EST SF 10 MIN: CPT | Performed by: SURGERY

## 2025-06-30 PROCEDURE — 1123F ACP DISCUSS/DSCN MKR DOCD: CPT | Performed by: SURGERY

## 2025-06-30 PROCEDURE — 1126F AMNT PAIN NOTED NONE PRSNT: CPT | Performed by: SURGERY

## 2025-06-30 PROCEDURE — 3017F COLORECTAL CA SCREEN DOC REV: CPT | Performed by: SURGERY

## 2025-06-30 PROCEDURE — G8427 DOCREV CUR MEDS BY ELIG CLIN: HCPCS | Performed by: SURGERY

## 2025-06-30 PROCEDURE — 1036F TOBACCO NON-USER: CPT | Performed by: SURGERY

## 2025-06-30 PROCEDURE — G8417 CALC BMI ABV UP PARAM F/U: HCPCS | Performed by: SURGERY

## 2025-06-30 ASSESSMENT — PATIENT HEALTH QUESTIONNAIRE - PHQ9
SUM OF ALL RESPONSES TO PHQ QUESTIONS 1-9: 0
2. FEELING DOWN, DEPRESSED OR HOPELESS: NOT AT ALL
1. LITTLE INTEREST OR PLEASURE IN DOING THINGS: NOT AT ALL
SUM OF ALL RESPONSES TO PHQ QUESTIONS 1-9: 0

## 2025-06-30 NOTE — PROGRESS NOTES
Physician Progress Note      PATIENT:               FALLON SOUTH  [facility] #:                  [id number]  :                       1955  ADMIT DATE:       2025 12:10 AM  DISCH DATE:        2025 11:20 AM  RESPONDING  PROVIDER #:        SHARLA JAIN          QUERY TEXT:    UTI is documented in the medical record  Discharge Summary by Sharla Jain PA-C at 2025.  Please clarify the relationship, if any, with the   UTI and del valle catheter    The clinical indicators include:  This 70yrs male presented with Fatigue    -UTI, del valle catheter, sepsis    -\"Patient presents with Fatigue, After the angiogram on 6/10, patient was   noted to have hematuria and a Del Valle was placed.  Del Valle was removed on Monday,   ,  He was diagnosed with urinary tract infection and started taking Doxy,   first dose was today\"(ED Provider Notes by Colette Viramontes MD at 2025)    -\"sepsis 2/2 UTI, UA grossly infected with pyuria, hematuria, 2+ bacteria and   nitrate positive\"(Progress Notes by Sharla Jain PA-C at 2025)    -\"Acute cystitis with hematuria, Sepsis, due to unspecified organism\"(Consults   by Chadwick Agrawal MD at 2025)    -\"Patient also with urinary retention in the ED, del valle catheter subsequently   placed. Noted to have significant clots and hematuria \"(Progress Notes by   Sharla Jain PA-C at 2025)    -\"Will discharge patient with oral ciprofloxacin for UTI, additional 4 days   for full 1 week course\"(Discharge Summary by Sharla Jain PA-C at   2025)    -\"Urinary bladder is decompressed with Del Valle catheter in place\"(CT Abdomen   )    -Urinalysis with Microscopic ON     Appearance Bloody, Protein, UA >300, Blood, Urine, BACTERIA, URINE 2+        -piperacillin-tazobactam (ZOSYN) 4,500 mg in sodium chloride 0.9 %, vancomycin   (VANCOCIN) 2,250 mg in sodium chloride 0.9 %  Thank you  Brian Umaña S CDS  Options provided:  -- UTI related to chronic

## 2025-06-30 NOTE — PROGRESS NOTES
Identified pt with two pt identifiers (name and ). Reviewed chart in preparation for visit and have obtained necessary documentation.    Stewart Ramos is a 70 y.o. male  Chief Complaint   Patient presents with    Post-Op Check     Left angiogram      /70 (BP Site: Left Upper Arm, Patient Position: Sitting, BP Cuff Size: Medium Adult)   Pulse 79   Temp 98 °F (36.7 °C) (Oral)   Resp 23   Ht 1.702 m (5' 7\")   Wt 85.5 kg (188 lb 8 oz)   SpO2 98%   BMI 29.52 kg/m²     1. Have you been to the ER, urgent care clinic since your last visit?  Hospitalized since your last visit?yes - [facility]    2. Have you seen or consulted any other health care providers outside of the [facility] since your last visit?  Include any pap smears or colon screening. no

## 2025-07-02 ENCOUNTER — PROCEDURE VISIT (OUTPATIENT)
Age: 70
End: 2025-07-02
Payer: MEDICARE

## 2025-07-02 VITALS — SYSTOLIC BLOOD PRESSURE: 129 MMHG | DIASTOLIC BLOOD PRESSURE: 65 MMHG

## 2025-07-02 DIAGNOSIS — N30.90 CYSTITIS: ICD-10-CM

## 2025-07-02 DIAGNOSIS — R33.9 URINARY RETENTION: Primary | ICD-10-CM

## 2025-07-02 DIAGNOSIS — R33.8 BENIGN PROSTATIC HYPERPLASIA WITH URINARY RETENTION: ICD-10-CM

## 2025-07-02 DIAGNOSIS — N40.1 BENIGN PROSTATIC HYPERPLASIA WITH URINARY RETENTION: ICD-10-CM

## 2025-07-02 DIAGNOSIS — R33.9 URINARY RETENTION: ICD-10-CM

## 2025-07-02 LAB — PVR, POC: 9 CC

## 2025-07-02 PROCEDURE — 1111F DSCHRG MED/CURRENT MED MERGE: CPT | Performed by: UROLOGY

## 2025-07-02 PROCEDURE — 52000 CYSTOURETHROSCOPY: CPT | Performed by: UROLOGY

## 2025-07-02 PROCEDURE — 99214 OFFICE O/P EST MOD 30 MIN: CPT | Performed by: UROLOGY

## 2025-07-02 PROCEDURE — 1123F ACP DISCUSS/DSCN MKR DOCD: CPT | Performed by: UROLOGY

## 2025-07-02 PROCEDURE — 51741 ELECTRO-UROFLOWMETRY FIRST: CPT | Performed by: UROLOGY

## 2025-07-02 PROCEDURE — G8417 CALC BMI ABV UP PARAM F/U: HCPCS | Performed by: UROLOGY

## 2025-07-02 PROCEDURE — 3017F COLORECTAL CA SCREEN DOC REV: CPT | Performed by: UROLOGY

## 2025-07-02 PROCEDURE — 51725 SIMPLE CYSTOMETROGRAM: CPT | Performed by: UROLOGY

## 2025-07-02 PROCEDURE — 51798 US URINE CAPACITY MEASURE: CPT | Performed by: UROLOGY

## 2025-07-02 PROCEDURE — 1036F TOBACCO NON-USER: CPT | Performed by: UROLOGY

## 2025-07-02 PROCEDURE — G8428 CUR MEDS NOT DOCUMENT: HCPCS | Performed by: UROLOGY

## 2025-07-02 RX ORDER — CEFADROXIL 500 MG/1
500 CAPSULE ORAL 2 TIMES DAILY
Qty: 20 CAPSULE | Refills: 0 | Status: SHIPPED | OUTPATIENT
Start: 2025-07-02 | End: 2025-07-12

## 2025-07-02 RX ORDER — TAMSULOSIN HYDROCHLORIDE 0.4 MG/1
0.4 CAPSULE ORAL DAILY
Qty: 30 CAPSULE | Refills: 5 | Status: SHIPPED | OUTPATIENT
Start: 2025-07-02

## 2025-07-02 RX ORDER — CIPROFLOXACIN 500 MG/1
500 TABLET, FILM COATED ORAL ONCE
Status: SHIPPED | OUTPATIENT
Start: 2025-07-02

## 2025-07-02 NOTE — PROGRESS NOTES
OFFICE CYSTOSCOPY    The patient presented for cystoscopy and was placed supine on the procedure table.  The genitals were prepped with chlorahexadine and a Urojet.  Using a 16F flexible cystoscope, cystourerthroscopy was performed.  Cystoscopy - Male    Findings:    Initial residual: minimal  Anterior urethra:  Pendulous urethra patent without strictures  Prostate:  coapting lateral lobes bilobar hypertrophy with elevated bladder neck  Bladder neck: Normal appearing and intravesical median lobe  Bladder mucosa: no tumors  no erythema      Trabeculation: 3+  Diverticula: none  Ureteral orifices: normal, efflux clear urine    CMG    Initial urge at (cc): 100  Strong urge at (cc): 150  Findings: No uninhibited contractions noted.  No urge related incontinence noted    Uroflow/ PVR    Max Flow: 6 ml/sec    Avg flow: 3 ml/sec    Voided Volume:  112ml    Residual Volume:9ml    Shape of the curve: flattened curve    Impression: Bladder outlet obstruction with prostate he was able to empty his bladder so for now we will wait and watch if he continues to have problems with voiding and retention , he most likely will need a TURP

## 2025-07-02 NOTE — PROGRESS NOTES
HISTORY OF PRESENT ILLNESS  Stewart Ramos is a 70 y.o. male   Patient returns to with urinary retention.  He has been on Flomax.  Cystoscopy revealed BPH mostly trilobar hypertrophy.  Strictures appeared to be minimal at this point.  Will keep him on the Flomax he is able to void today.  If he continues to have problems he will require surgery of some type to open up his bladder neck.  1. Urinary retention  Overview:  S/p del valle catheter placement and urethral stricture dilation on 6/10/2025  PSA pending   Del Valle catheter emoved on 6/16/2025  Del Valle catheter placed during recent admission to the hospital on 6/19/2025  - He will continue with flomax and ciprofloxacin  Plan for cystoscopy and urodynamics on 6/30/2025   Orders:  -     AMB POC PVR, KASHIF,POST-VOID RES,US,NON-IMAGING  -     ciprofloxacin (CIPRO) tablet 500 mg; 500 mg, Oral, ONCE, 1 dose, On Wed 7/2/25 at 1130Antimicrobial Indications: Surgical ProphylaxisDo not take with dairy products or calcium-fortified juices. Tube feeding (TF) interaction, obtain physician order to manage. Recommend holding TF for 1 hr before and 1 hr after dose. Due to decreased absorption do not give by J tube.     -     AMB POC UROFLOWMETRY  -     tamsulosin (FLOMAX) 0.4 MG capsule; Take 1 capsule by mouth daily, Disp-30 capsule, R-5Normal  2. Urinary retention [R33.9]  Overview:  S/p del valle catheter placement and urethral stricture dilation on 6/10/2025  PSA pending   Del Valle catheter emoved on 6/16/2025  Del Valle catheter placed during recent admission to the hospital on 6/19/2025  - He will continue with flomax and ciprofloxacin  Plan for cystoscopy and urodynamics on 6/30/2025   Orders:  -     AMB POC PVR, KASHIF,POST-VOID RES,US,NON-IMAGING  -     ciprofloxacin (CIPRO) tablet 500 mg; 500 mg, Oral, ONCE, 1 dose, On Wed 7/2/25 at 1130Antimicrobial Indications: Surgical ProphylaxisDo not take with dairy products or calcium-fortified juices. Tube feeding (TF) interaction, obtain physician

## 2025-07-08 NOTE — PROGRESS NOTES
Physician Progress Note      PATIENT:               FALLON SOUTH  [facility] #:                  [id number]  :                       1955  ADMIT DATE:       6/10/2025 6:00 AM  DISCH DATE:        2025 11:24 AM  RESPONDING  PROVIDER #:        Purvi Mcnally MD          QUERY TEXT:    Please clarify in documentation the relationship, if any, between gross   hematuria and placement of Canales catheter.    The clinical indicators include:  -\"Acute urinary retention secondary to suspected urethral stricture. Gross   hematuria likely traumatic\" (H&P by Purvi Mcnally MD at 6/10/2025)  -\"Postoperatively patient developed acute urinary retention required Canales   catheter placemen on 6/10. Postplacement of Canales catheter patient noted to   have desmond hematuria for which hospitalist was consulted...Denies any previous   history of instrumentation, is very surprised that he has possible ureteral   stricture\" (Discharge Summary by Purvi Mcnally MD at 2025)  Options provided:  -- Gross hematuria due to trauma during placement of Canales catheter  -- Gross hematuria due to urethral stricture  -- Other - I will add my own diagnosis  -- Disagree - Not applicable / Not valid  -- Disagree - Clinically unable to determine / Unknown  -- Refer to Clinical Documentation Reviewer    PROVIDER RESPONSE TEXT:    The gross hematuria noted is due to trauma during placement of Canales catheter.    Query created by: Mohit Puente on 2025 1:48 PM      Electronically signed by:  Purvi Mcnally MD 2025 7:17 PM

## 2025-07-08 NOTE — PROGRESS NOTES
Vascular History and Physical    Patient: Stewart Ramos  MRN: [id number]    YOB: 1955  Age: 70 y.o.  Sex: male     Chief Complaint:  Chief Complaint   Patient presents with    Post-Op Check     Left angiogram        History of Present Illness: Stewart Ramos is a 70 y.o. very pleasant gentleman is here today for follow-up surveillance vascular examination for recent left leg angiogram.  Patient currently doing well without any worsening pain or numbness of the left leg.  No issues with the right groin.    Social History:  Social Connections: Not on file       Past Medical History:  Past Medical History:   Diagnosis Date    CVA (cerebral vascular accident) (HCC)     early 2000's, right sided weakness    Diabetes mellitus (HCC)     Hepatitis C     Hypertension     Sleep apnea     no CPAP    Urinary retention 6/14/2025       Surgical History:  Past Surgical History:   Procedure Laterality Date    CAROTID ARTERY SURGERY Left     INVASIVE VASCULAR N/A 6/10/2025    Angiography lower ext left performed by Joel Robertson MD at [facility] ELECTROPHYSIOLOGY    INVASIVE VASCULAR N/A 6/10/2025    Ultrasound guided vascular access performed by Joel Robertson MD at [facility] ELECTROPHYSIOLOGY    INVASIVE VASCULAR N/A 6/10/2025    Aortagram abdominal performed by Joel Robertson MD at [facility] ELECTROPHYSIOLOGY    INVASIVE VASCULAR N/A 6/10/2025    Atherectomy common femoral artery performed by Joel Robertson MD at [facility] ELECTROPHYSIOLOGY    INVASIVE VASCULAR N/A 6/10/2025    Angioplasty common femoral artery performed by Joel Robertson MD at [facility] ELECTROPHYSIOLOGY    INVASIVE VASCULAR N/A 6/10/2025    Pta femoral popliteal artery performed by Joel Robertson MD at [facility] ELECTROPHYSIOLOGY    INVASIVE VASCULAR N/A 6/10/2025    Atherectomy  femoral popliteal performed by Joel Robertson MD at [facility] ELECTROPHYSIOLOGY       Allergies:  No Known Allergies    Current Meds:  Current Outpatient Medications   Medication Sig Dispense Refill

## 2025-07-16 ENCOUNTER — OFFICE VISIT (OUTPATIENT)
Age: 70
End: 2025-07-16
Payer: MEDICARE

## 2025-07-16 VITALS — DIASTOLIC BLOOD PRESSURE: 68 MMHG | HEART RATE: 65 BPM | SYSTOLIC BLOOD PRESSURE: 132 MMHG

## 2025-07-16 DIAGNOSIS — N40.1 BENIGN PROSTATIC HYPERPLASIA WITH URINARY RETENTION: ICD-10-CM

## 2025-07-16 DIAGNOSIS — R33.8 BENIGN PROSTATIC HYPERPLASIA WITH URINARY RETENTION: ICD-10-CM

## 2025-07-16 DIAGNOSIS — R39.198 SLOW URINARY STREAM: ICD-10-CM

## 2025-07-16 DIAGNOSIS — N30.90 CYSTITIS: ICD-10-CM

## 2025-07-16 DIAGNOSIS — R31.9 HEMATURIA, UNSPECIFIED TYPE: ICD-10-CM

## 2025-07-16 DIAGNOSIS — R33.9 URINARY RETENTION: ICD-10-CM

## 2025-07-16 DIAGNOSIS — R33.9 URINARY RETENTION: Primary | ICD-10-CM

## 2025-07-16 DIAGNOSIS — N35.919 STRICTURE OF MALE URETHRA, UNSPECIFIED STRICTURE TYPE: ICD-10-CM

## 2025-07-16 LAB
BILIRUBIN, URINE, POC: NEGATIVE
BLOOD URINE, POC: ABNORMAL
GLUCOSE URINE, POC: NEGATIVE
KETONES, URINE, POC: NEGATIVE
LEUKOCYTE ESTERASE, URINE, POC: ABNORMAL
NITRITE, URINE, POC: NEGATIVE
PH, URINE, POC: 5.5 (ref 4.6–8)
PROTEIN,URINE, POC: 300
PVR, POC: 191 CC
SPECIFIC GRAVITY, URINE, POC: 1.02 (ref 1–1.03)
URINALYSIS CLARITY, POC: CLEAR
URINALYSIS COLOR, POC: YELLOW
UROBILINOGEN, POC: ABNORMAL

## 2025-07-16 PROCEDURE — G8417 CALC BMI ABV UP PARAM F/U: HCPCS | Performed by: UROLOGY

## 2025-07-16 PROCEDURE — G8428 CUR MEDS NOT DOCUMENT: HCPCS | Performed by: UROLOGY

## 2025-07-16 PROCEDURE — 1036F TOBACCO NON-USER: CPT | Performed by: UROLOGY

## 2025-07-16 PROCEDURE — 51798 US URINE CAPACITY MEASURE: CPT | Performed by: UROLOGY

## 2025-07-16 PROCEDURE — 3017F COLORECTAL CA SCREEN DOC REV: CPT | Performed by: UROLOGY

## 2025-07-16 PROCEDURE — 1126F AMNT PAIN NOTED NONE PRSNT: CPT | Performed by: UROLOGY

## 2025-07-16 PROCEDURE — 81003 URINALYSIS AUTO W/O SCOPE: CPT | Performed by: UROLOGY

## 2025-07-16 PROCEDURE — 1111F DSCHRG MED/CURRENT MED MERGE: CPT | Performed by: UROLOGY

## 2025-07-16 PROCEDURE — 99214 OFFICE O/P EST MOD 30 MIN: CPT | Performed by: UROLOGY

## 2025-07-16 PROCEDURE — 1123F ACP DISCUSS/DSCN MKR DOCD: CPT | Performed by: UROLOGY

## 2025-07-16 RX ORDER — TAMSULOSIN HYDROCHLORIDE 0.4 MG/1
0.4 CAPSULE ORAL DAILY
Qty: 30 CAPSULE | Refills: 5 | Status: SHIPPED | OUTPATIENT
Start: 2025-07-16

## 2025-07-16 NOTE — PROGRESS NOTES
HISTORY OF PRESENT ILLNESS  Stewart Ramos is a 70 y.o. male   Patient returns today doing well.  His stream is still weak but he had voiding he is happy where he is.  Stream is still slow he wants to return in 3 - send the present medications of tamsulosin and go from there  1. Urinary retention  Overview:  S/p del valle catheter placement and urethral stricture dilation on 6/10/2025  PSA pending   Del Valle catheter emoved on 6/16/2025  Del Valle catheter placed during recent admission to the hospital on 6/19/2025  - He will continue with flomax and ciprofloxacin     He is s/p cystoscopy and urodynamics on 6/30/2025 with findings   Bladder outlet obstruction with prostate he was able to empty his bladder so for now we will wait and watch if he continues to have problems with voiding and retention , he most likely will need a TURP    Tx with tamsulosin  Orders:  -     AMB POC URINALYSIS DIP STICK AUTO W/O MICRO  -     AMB POC PVR, KASHIF,POST-VOID RES,US,NON-IMAGING  -     Culture, Urine  -     tamsulosin (FLOMAX) 0.4 MG capsule; Take 1 capsule by mouth daily, Disp-30 capsule, R-5Print  2. Hematuria, unspecified type  3. Stricture of male urethra, unspecified stricture type  Overview:  He is s/p urethral dilation with 18 F on 6/10/2025   4. Benign prostatic hyperplasia with urinary retention  -     tamsulosin (FLOMAX) 0.4 MG capsule; Take 1 capsule by mouth daily, Disp-30 capsule, R-5Print  5. Cystitis  6. Slow urinary stream  7. Urinary retention [R33.9]  Overview:  S/p del valle catheter placement and urethral stricture dilation on 6/10/2025  PSA pending   Del Valle catheter emoved on 6/16/2025  Del Valle catheter placed during recent admission to the hospital on 6/19/2025  - He will continue with flomax and ciprofloxacin     He is s/p cystoscopy and urodynamics on 6/30/2025 with findings   Bladder outlet obstruction with prostate he was able to empty his bladder so for now we will wait and watch if he continues to have problems with

## 2025-07-17 LAB — BACTERIA UR CULT: NO GROWTH

## 2025-07-25 NOTE — PROGRESS NOTES
Physician Progress Note      PATIENT:               FALLON SOUTH  [facility] #:                  [id number]  :                       1955  ADMIT DATE:       6/10/2025 6:00 AM  DISCH DATE:        2025 11:24 AM  RESPONDING  PROVIDER #:        Purvi Mcnally MD          QUERY TEXT:    Please clarify the following documentation:    The clinical indicators include:  -Patient is status post left angiogram.  -Patient has history of urethral stricture.  -\"Acute urinary retention secondary to suspected urethral stricture. Gross   hematuria likely traumatic\" (H&P by Purvi Mcnally MD at 6/10/2025)  -\"Postoperatively patient developed acute urinary retention required Canales   catheter placemen on 6/10. Postplacement of Canales catheter patient noted to   have desmond hematuria for which hospitalist was consulted...Denies any previous   history of instrumentation, is very surprised that he has possible ureteral   stricture\" (Discharge Summary by Purvi Mcnally MD at 2025)  Options provided:  -- Urinary retention is related to the left angiogram  -- Urinary retention is not related to the procedure, but is related to   urethral stricture  -- Other - I will add my own diagnosis  -- Disagree - Not applicable / Not valid  -- Disagree - Clinically unable to determine / Unknown  -- Refer to Clinical Documentation Reviewer    PROVIDER RESPONSE TEXT:    Urinary retention is not related to the procedure, but related to urethral   stricture.    Query created by: Mohit Puente on 2025 11:05 AM      Electronically signed by:  Purvi Mcnally MD 2025 12:42 AM

## (undated) DEVICE — GUIDEWIRE VASC ANGLED 3 CM 0.035 INX260 CM STD NIT GLIDEWIRE

## (undated) DEVICE — SPONGE GZ 16 PLY WVN COT 4INX4IN STERIL

## (undated) DEVICE — Device

## (undated) DEVICE — CATHETER ANGIOPLASTY OTW 0.018 IN 130CM 5X120MM  018 DCB

## (undated) DEVICE — DEVICE EMBOLIC PROTCT L320MM DIA6MM 0.014IN NIT OTW

## (undated) DEVICE — SET ACCS CATH 5FR L10CM NDL 21GA L7CM GWIRE L40CM 0.018IN

## (undated) DEVICE — SHEATH INTRO 6FR L10CM MINI GWIRE L45CM 0.035IN COR KINK

## (undated) DEVICE — SYSTEM ATHRCTMY SHTH 7FR L114CM TIP L9.6CM VES DIA3.5-7MM

## (undated) DEVICE — TRAY SURG CUST CRD CATH 3 PRT SSR

## (undated) DEVICE — TOWEL SURG W17XL27IN BLU COT PREWASHED DELINTED DLX

## (undated) DEVICE — COVER PRB INTOP 96X6 IN LF

## (undated) DEVICE — CATHETER MARINER BRAID RIM 5FX65 CM .035 IN.DIAGNOSTIC

## (undated) DEVICE — GUIDEWIRE VASC ANGLED 3 CM 0.035 INX260 CM STIFF GLIDEWIRE

## (undated) DEVICE — DEVICE INFL 20ML BRL POLYCARB ANALG LUMINESCENT DISPLAY ANG